# Patient Record
Sex: FEMALE | Race: WHITE
[De-identification: names, ages, dates, MRNs, and addresses within clinical notes are randomized per-mention and may not be internally consistent; named-entity substitution may affect disease eponyms.]

---

## 2017-10-10 ENCOUNTER — HOSPITAL ENCOUNTER (OUTPATIENT)
Dept: HOSPITAL 92 - SDC | Age: 67
Discharge: HOME | End: 2017-10-10
Attending: INTERNAL MEDICINE
Payer: MEDICARE

## 2017-10-10 VITALS — BODY MASS INDEX: 36.3 KG/M2

## 2017-10-10 DIAGNOSIS — I10: ICD-10-CM

## 2017-10-10 DIAGNOSIS — Z96.641: ICD-10-CM

## 2017-10-10 DIAGNOSIS — Z88.8: ICD-10-CM

## 2017-10-10 DIAGNOSIS — F31.9: ICD-10-CM

## 2017-10-10 DIAGNOSIS — K62.89: ICD-10-CM

## 2017-10-10 DIAGNOSIS — E03.9: ICD-10-CM

## 2017-10-10 DIAGNOSIS — K57.30: ICD-10-CM

## 2017-10-10 DIAGNOSIS — Z88.1: ICD-10-CM

## 2017-10-10 DIAGNOSIS — Z86.010: ICD-10-CM

## 2017-10-10 DIAGNOSIS — Z79.899: ICD-10-CM

## 2017-10-10 DIAGNOSIS — E66.01: ICD-10-CM

## 2017-10-10 DIAGNOSIS — Z90.49: ICD-10-CM

## 2017-10-10 DIAGNOSIS — M19.90: ICD-10-CM

## 2017-10-10 DIAGNOSIS — Z88.2: ICD-10-CM

## 2017-10-10 DIAGNOSIS — Z99.89: ICD-10-CM

## 2017-10-10 DIAGNOSIS — B15.9: ICD-10-CM

## 2017-10-10 DIAGNOSIS — Z87.891: ICD-10-CM

## 2017-10-10 DIAGNOSIS — Z12.11: Primary | ICD-10-CM

## 2017-10-10 DIAGNOSIS — G47.30: ICD-10-CM

## 2017-10-10 PROCEDURE — 0DJD8ZZ INSPECTION OF LOWER INTESTINAL TRACT, VIA NATURAL OR ARTIFICIAL OPENING ENDOSCOPIC: ICD-10-PCS | Performed by: INTERNAL MEDICINE

## 2017-10-10 NOTE — HP
Jarrell Armendariz is a 67-year-old  male with a history of colon  polyp.
  The patient had a colonoscopy and polypectomy 5 years ago.  The patient has 
family history of colon cancer.  Apparently, he had a dose of cancer.  At the 
present time, Ms. Vieyra has no specific GI symptoms.  He comes for colonoscopy 
because of history of colon polyp and also family history of colon cancer.

 

ALLERGIES:  BACTRIM Synvisc C.

 

SOCIAL HISTORY:  Former smoker.  Does not drink alcohol.

 

MEDICAL ILLNESSES:

1.  Hypertension.

2.  Morbid obesity.

3.  Heart murmur

4.  Hypothyroidism.

5.  Osteoarthritis.

6.  Depression.

7.  Status post open cholecystectomy.

8.  Status post right hip replacement.

9.  Removal of a  swelling over the right side which was benign tumor many 
years ago.

 

PHYSICAL EXAMINATION:

GENERAL:  Patient is obese.

VITAL SIGNS:  Pulse is 70, blood pressure 130/70.

HEENT:  Conjunctivae clear.

CARDIOVASCULAR:  First and second heart sounds normal.

LUNGS:  Clear to  auscultation.

ABDOMEN:  Soft to palpate.  Abdomen is nontender.  There is no organomegaly or 
masses.

EXTREMITIES:  Reveal no edema.

 

ADMITTING DIAGNOSES:  Colon polyps, family history  of colon cancer.

 

PLAN:  Colonoscopy.

 

MARICRUZ

## 2017-10-10 NOTE — OP
DATE OF SURGERY:  10/10/2017

 

OPERATIVE PROCEDURE:  Colonoscopy. 

 

PREOPERATIVE DIAGNOSIS:  A 67-year-old  female with history of colon polyp.  She also has f
amily history of colon cancer.

 

POSTOPERATIVE DIAGNOSES:

1.  Sigmoid diverticular disease.

2.  Occasional diverticula of the proximal colon.

3.  Hypertrophied anal papillae.

 

PROCEDURE NOTE:  The patient was placed on her left lateral position and [facility]  A rectal exam was done before scope was advanced into the rectum.  No lesions wer
e felt on rectal exam.  A Pentax video colonoscope was introduced into the rectum and advanced all t
he way to the cecum.  The ileocecal area, cecum, ascending colon, no pathology seen.  The hepatic fl
exure, no pathology seen.  The transverse colon showed an occasional diverticula.  The splenic flexu
re, descending colon, no lesions seen.  The sigmoid colon showed mild diverticular disease.  Retrofl
exion of the scope in the rectum showed hypertrophied anal papillae.

 

DISCHARGE PLANNING:  A 67-year-old  female with colon polyp and family history of colon can
cer.  She had a colonoscopy, which revealed basically hypertrophied anal papillae and also mild dive
rticulosis.

 

DISCHARGE RECOMMENDATIONS:

1.  High-fiber diet.

2.  The patient was advised to call me if she developed abdominal pain, hematochezia, or fever.

3.  Repeat colonoscopy in 3-5 years.

## 2018-05-23 ENCOUNTER — HOSPITAL ENCOUNTER (OUTPATIENT)
Dept: HOSPITAL 92 - BICMAMMO | Age: 68
Discharge: HOME | End: 2018-05-23
Attending: FAMILY MEDICINE
Payer: MEDICARE

## 2018-05-23 DIAGNOSIS — Z80.3: ICD-10-CM

## 2018-05-23 DIAGNOSIS — Z12.31: Primary | ICD-10-CM

## 2018-05-23 PROCEDURE — 77067 SCR MAMMO BI INCL CAD: CPT

## 2018-05-23 PROCEDURE — 77063 BREAST TOMOSYNTHESIS BI: CPT

## 2019-01-09 ENCOUNTER — HOSPITAL ENCOUNTER (INPATIENT)
Dept: HOSPITAL 18 - NAV ACUTE | Age: 69
LOS: 28 days | Discharge: HOME HEALTH | DRG: 950 | End: 2019-02-06
Attending: INTERNAL MEDICINE | Admitting: INTERNAL MEDICINE
Payer: MEDICARE

## 2019-01-09 VITALS — BODY MASS INDEX: 38.2 KG/M2

## 2019-01-09 DIAGNOSIS — S76.112D: Primary | ICD-10-CM

## 2019-01-09 DIAGNOSIS — F41.9: ICD-10-CM

## 2019-01-09 DIAGNOSIS — M19.90: ICD-10-CM

## 2019-01-09 DIAGNOSIS — Z87.891: ICD-10-CM

## 2019-01-09 DIAGNOSIS — Z88.1: ICD-10-CM

## 2019-01-09 DIAGNOSIS — G47.33: ICD-10-CM

## 2019-01-09 DIAGNOSIS — Z96.653: ICD-10-CM

## 2019-01-09 DIAGNOSIS — R53.1: ICD-10-CM

## 2019-01-09 DIAGNOSIS — Z79.899: ICD-10-CM
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
## 2019-01-09 DIAGNOSIS — Z90.49: ICD-10-CM

## 2019-01-09 DIAGNOSIS — N18.2: ICD-10-CM

## 2019-01-09 DIAGNOSIS — M81.0: ICD-10-CM

## 2019-01-09 DIAGNOSIS — R53.81: ICD-10-CM

## 2019-01-09 DIAGNOSIS — E78.5: ICD-10-CM

## 2019-01-09 DIAGNOSIS — E66.01: ICD-10-CM

## 2019-01-09 DIAGNOSIS — Z88.8: ICD-10-CM

## 2019-01-09 DIAGNOSIS — F31.9: ICD-10-CM

## 2019-01-09 DIAGNOSIS — I12.9: ICD-10-CM

## 2019-01-09 PROCEDURE — 80053 COMPREHEN METABOLIC PANEL: CPT

## 2019-01-09 PROCEDURE — 36415 COLL VENOUS BLD VENIPUNCTURE: CPT

## 2019-01-09 PROCEDURE — 5A09357 ASSISTANCE WITH RESPIRATORY VENTILATION, LESS THAN 24 CONSECUTIVE HOURS, CONTINUOUS POSITIVE AIRWAY PRESSURE: ICD-10-PCS | Performed by: INTERNAL MEDICINE

## 2019-01-09 PROCEDURE — 85025 COMPLETE CBC W/AUTO DIFF WBC: CPT

## 2019-01-09 RX ADMIN — HYDROCODONE BITARTRATE AND ACETAMINOPHEN SCH TAB: 10; 325 TABLET ORAL at 21:33

## 2019-01-10 LAB
ALBUMIN SERPL BCG-MCNC: 3.8 G/DL (ref 3.4–4.8)
ALP SERPL-CCNC: 72 U/L (ref 40–150)
ALT SERPL W P-5'-P-CCNC: 15 U/L (ref 8–55)
ANION GAP SERPL CALC-SCNC: 12 MMOL/L (ref 10–20)
AST SERPL-CCNC: 16 U/L (ref 5–34)
BASOPHILS # BLD AUTO: 0.1 THOU/UL (ref 0–0.2)
BASOPHILS NFR BLD AUTO: 0.9 % (ref 0–1)
BILIRUB SERPL-MCNC: 0.3 MG/DL (ref 0.2–1.2)
BUN SERPL-MCNC: 17 MG/DL (ref 9.8–20.1)
CALCIUM SERPL-MCNC: 9.6 MG/DL (ref 7.8–10.44)
CHLORIDE SERPL-SCNC: 102 MMOL/L (ref 98–107)
CO2 SERPL-SCNC: 30 MMOL/L (ref 23–31)
CREAT CL PREDICTED SERPL C-G-VRATE: 138 ML/MIN (ref 70–130)
EOSINOPHIL # BLD AUTO: 0.2 THOU/UL (ref 0–0.7)
EOSINOPHIL NFR BLD AUTO: 2.2 % (ref 0–10)
GLOBULIN SER CALC-MCNC: 2.6 G/DL (ref 2.4–3.5)
GLUCOSE SERPL-MCNC: 103 MG/DL (ref 80–115)
HGB BLD-MCNC: 11.5 G/DL (ref 12–16)
LYMPHOCYTES # BLD AUTO: 2.4 THOU/UL (ref 1.2–3.4)
LYMPHOCYTES NFR BLD AUTO: 26.9 % (ref 21–51)
MCH RBC QN AUTO: 28.7 PG (ref 27–31)
MCV RBC AUTO: 87.5 FL (ref 78–98)
MONOCYTES # BLD AUTO: 0.8 THOU/UL (ref 0.11–0.59)
MONOCYTES NFR BLD AUTO: 8.8 % (ref 0–10)
NEUTROPHILS # BLD AUTO: 5.5 THOU/UL (ref 1.4–6.5)
NEUTROPHILS NFR BLD AUTO: 61.2 % (ref 42–75)
PLATELET # BLD AUTO: 317 THOU/UL (ref 130–400)
POTASSIUM SERPL-SCNC: 3.8 MMOL/L (ref 3.5–5.1)
RBC # BLD AUTO: 4.03 MILL/UL (ref 4.2–5.4)
SODIUM SERPL-SCNC: 140 MMOL/L (ref 136–145)
WBC # BLD AUTO: 9 THOU/UL (ref 4.8–10.8)

## 2019-01-10 RX ADMIN — HYDROCODONE BITARTRATE AND ACETAMINOPHEN PRN TAB: 10; 325 TABLET ORAL at 15:15

## 2019-01-10 RX ADMIN — HYDROCODONE BITARTRATE AND ACETAMINOPHEN PRN TAB: 10; 325 TABLET ORAL at 20:45

## 2019-01-10 RX ADMIN — HYDROCODONE BITARTRATE AND ACETAMINOPHEN PRN TAB: 10; 325 TABLET ORAL at 03:04

## 2019-01-10 RX ADMIN — HYDROCODONE BITARTRATE AND ACETAMINOPHEN SCH: 10; 325 TABLET ORAL at 02:00

## 2019-01-10 RX ADMIN — HYDROCODONE BITARTRATE AND ACETAMINOPHEN PRN TAB: 10; 325 TABLET ORAL at 06:33

## 2019-01-10 RX ADMIN — HYDROCODONE BITARTRATE AND ACETAMINOPHEN PRN TAB: 10; 325 TABLET ORAL at 10:58

## 2019-01-10 NOTE — PRG
DATE OF SERVICE:  01/10/2019



SUBJECTIVE:  The patient feels well, cooperating with therapy today, but states she

was exhausted in the afternoon, so did not receive therapy this afternoon.  She

however is felt well, but wants to ensure that she gets her Lamictal at night and

amphetamine during the day.  Pharmacy states that we do not have extra amphetamines,

so we will give her methylphenidate equivalent twice daily. 



OBJECTIVE:  VITAL SIGNS:  Blood pressure 111/56, temperature is 98, pulse 83,

respirations are 18, and O2 sats 91% on room air. 

LUNGS:  Clear. 

CARDIAC:  Examination showed regular rhythm. 

ABDOMEN:  Soft and nontender. 

EXTREMITIES:  Left leg is in a splint.  Minimal pain, functioning well with therapy.



ASSESSMENT:  Resolving left total knee with subsequent patellar tendon tear after a

fall with subsequent repair.  Now in knee immobilizer with minimal pain at rest but

cooperating with therapy.  She also has significant bipolar disease, but appears to

be stable at this time. 



PLAN:  Continue PT/OT.  Change Adderall to Ritalin 10 mg twice daily at Pharmacy's

suggestion.  Continue Lamictal 200 at night.  Continue pain relief as needed.

Continue postoperative antibiotics.  Continue stress ulcer and DVT prophylaxis. 







Job ID:  [id number]

## 2019-01-10 NOTE — HP
Ms. Bonnie Vieyra is a patient of Dr. Serena Cabrales and Dr. Hunter Milligan.



HISTORY OF PRESENT ILLNESS:  The patient is a 68-year-old white female who underwent

left total knee replacement on October 2, 2018, and did well until approximately 3

weeks postop when she fell and suffered a lateral subluxation of her patella

requiring surgical repair.  She did well again on this until 6 weeks after that

procedure when she sustained another fall with complete lateral dislocation of

patella requiring open reduction, repair of quadriceps tendon rupture and the medial

reticular tear.  She did well postoperatively in the hospital, but was admitted to

the skilled unit today for continued therapy and for safety awareness, she is on a

knee immobilizer at all times until seen by her orthopedic surgeon in 2 weeks. 



PAST MEDICAL HISTORY:  Remarkable for;

1. Hypertension, which has been well controlled.

2. Dyslipidemia.

3. Morbid obesity.

4. Osteoarthritis.

5. Bipolar disorder.

6. Obstructive sleep apnea.



PAST SURGICAL HISTORY:  Positive for right knee replacement many years ago,

cholecystectomy, and the above-mentioned left knee replacement. 



FAMILY MEDICAL HISTORY:  Positive for stroke in her mother.  Breast cancer in two

sisters. 



SOCIAL HISTORY:  She has not smoked for 35 years.



ALLERGIES:  SHE HAS HISTORY OF ALLERGIES TO HYALURONIC ACID INJECTIONS AND BACTRIM.



HOME MEDICATIONS:  Include;

1. Hydrochlorothiazide 25 daily.

2. Levothyroxine 75 daily.

3. Fosamax 70 weekly.

4. Gabapentin 600 three times daily.

5. Lamictal 150 mg at night.

6. Clonazepam 1 mg twice daily.

7. Adderall 20 mg twice daily.



REVIEW OF SYSTEMS:  HEENT:  She denies any headaches, dizziness, change in vision or

hearing, hoarseness or dysphagia. 

PULMONARY:  She denies any cough, sputum production, pneumonia, asthma, or

tuberculosis. 

CARDIOVASCULAR:  She denies any chest pain, orthopnea, paroxysmal nocturnal dyspnea,

or edema. 

GASTROINTESTINAL:  Denies nausea, vomiting, diarrhea, constipation, or abdominal

pain. 

GENITOURINARY:  Denies dysuria, hematuria, or nocturia. 

MUSCULOSKELETAL:  Does have some arthritis and arthralgias, multiple joints. 

NEUROLOGIC:  Denies localized numbness or weakness to arms and extremities.



PHYSICAL EXAMINATION:

GENERAL:  The patient is a middle-aged white female in no acute distress.  Oriented

x3.  Cooperative. 

VITAL SIGNS:  Showed to have a blood pressure of 137/64, temperature is 99, pulse

87, respirations 16, and O2 sats 96% on room air. 

HEENT:  Pupils are equal, round, and reactive to light and accommodation.  Sclerae

anicteric.  Conjunctivae pale.  Oral mucous membranes, well hydrated. 

NECK:  Supple.  There are no nodes or masses.  JVP is not elevated. 

LUNGS:  Clear. 

CARDIAC:  Showed regular rhythm. 

ABDOMEN:  Soft and nontender. 

SKIN AND EXTREMITIES:  Show left knee in knee immobilizer, but removed dressing,

evaluated shows minimal drainage.  No erythema of the leg. 

NEUROLOGICAL:  No focal findings.



LABORATORY DATA:  Laboratories pending.  Previous labs showed creatinine of 1.0.

Platelets of 368, white count 9900, hemoglobin 13.2.  Potassium of 3.5. 



ASSESSMENT:  

1. Left knee replacement status post fall with quadriceps tendon repair and medial

patellofemoral ligament reconstruction.  She was admitted to skilled unit for PT,

OT, and monitoring of safety while incomplete knee immobilization. 

2. Hypertension, well controlled.

3. Bipolar disorder, stable, according to the patient, on long-term medications.

4. Obstructive sleep apnea, on CPAP.  We will obtain machine.

5. Chronic kidney disease stage 2, and we will obtain lab.



PLAN:  Continue pantoprazole for GI prophylaxis.  Lovenox for DVT prophylaxis.

Fosamax as ordered.  Cardiac diet.  CPAP at night.  Continue prehospitalization

medications of fluoxetine, Lamictal, clonazepam, and hydrocodone as needed for pain. 







Job ID:  [id number]

## 2019-01-11 RX ADMIN — HYDROCODONE BITARTRATE AND ACETAMINOPHEN PRN TAB: 10; 325 TABLET ORAL at 14:04

## 2019-01-11 RX ADMIN — HYDROCODONE BITARTRATE AND ACETAMINOPHEN PRN TAB: 10; 325 TABLET ORAL at 09:39

## 2019-01-11 RX ADMIN — HYDROCODONE BITARTRATE AND ACETAMINOPHEN PRN TAB: 10; 325 TABLET ORAL at 05:03

## 2019-01-11 RX ADMIN — HYDROCODONE BITARTRATE AND ACETAMINOPHEN PRN TAB: 10; 325 TABLET ORAL at 00:53

## 2019-01-11 RX ADMIN — HYDROCODONE BITARTRATE AND ACETAMINOPHEN PRN TAB: 10; 325 TABLET ORAL at 20:38

## 2019-01-12 RX ADMIN — HYDROCODONE BITARTRATE AND ACETAMINOPHEN PRN TAB: 10; 325 TABLET ORAL at 04:49

## 2019-01-12 RX ADMIN — HYDROCODONE BITARTRATE AND ACETAMINOPHEN PRN TAB: 10; 325 TABLET ORAL at 10:46

## 2019-01-12 RX ADMIN — HYDROCODONE BITARTRATE AND ACETAMINOPHEN PRN TAB: 10; 325 TABLET ORAL at 19:57

## 2019-01-12 RX ADMIN — HYDROCODONE BITARTRATE AND ACETAMINOPHEN PRN TAB: 10; 325 TABLET ORAL at 09:53

## 2019-01-12 RX ADMIN — HYDROCODONE BITARTRATE AND ACETAMINOPHEN PRN TAB: 10; 325 TABLET ORAL at 15:11

## 2019-01-12 NOTE — PRG
DATE OF SERVICE:  01/12/2019



SUBJECTIVE:  Ms. Vieyra is doing well except for pain.  She states that one Norco is

not helping, I did increase it to two hydrocodone q.4 p.r.n. and that seemed to

help.  No other concerns or questions.  No family at bedside. 



OBJECTIVE:  VITAL SIGNS:  She is afebrile.  Heart rate 73, respirations 20, oxygen

saturation 94% on room air, and blood pressure 107/54. 

CARDIOVASCULAR:  S1 and S2 plus. 

RESPIRATORY:  Normal vesicular breath sounds. 

ABDOMEN:  Soft, nontender.  Bowel sounds heard in all quadrants. 

EXTREMITIES:  Without cyanosis or clubbing.  Left leg in a knee immobilizer.



LABORATORY DATA:  Laboratory value shows white count of 9, H and H are 11.5 and

33.2, this is from January 10th.  Sodium 140, potassium 3.8, BUN and creatinine 17

and 0.70. 



IMPRESSION:  

1. Left knee replacement complicated by two falls causing initially a lateral

subluxation of patella, requiring surgical repair and another fall causing complete

lateral dislocation of the patella as well as quadriceps tendon rupture and medial

retinacular tear. 

2. Hypertension.

3. Dyslipidemia.

4. Morbid obesity.

5. Degenerative joint disease.

6. Obstructive sleep apnea.

7. Bipolar disorder and osteoporosis.



PLAN:  

1. Increase Norco to 10/325 two tablets q.4 p.r.n.

2. Continue Orthopedic precautions.

3. Physical therapy.

4. Nocturnal CPAP.

5. Heart healthy diet.

6. DVT and stress ulcer prophylaxis.

7. Decubitus precautions.

8. Routine laboratory values.







Job ID:  [id number]

## 2019-01-13 RX ADMIN — HYDROCODONE BITARTRATE AND ACETAMINOPHEN PRN TAB: 10; 325 TABLET ORAL at 06:04

## 2019-01-13 RX ADMIN — HYDROCODONE BITARTRATE AND ACETAMINOPHEN PRN TAB: 10; 325 TABLET ORAL at 17:59

## 2019-01-13 RX ADMIN — HYDROCODONE BITARTRATE AND ACETAMINOPHEN PRN TAB: 10; 325 TABLET ORAL at 10:20

## 2019-01-13 RX ADMIN — HYDROCODONE BITARTRATE AND ACETAMINOPHEN PRN TAB: 10; 325 TABLET ORAL at 01:39

## 2019-01-13 NOTE — PRG
DATE OF SERVICE:  01/13/2019



SUBJECTIVE:  Ms. Vieyra is doing well.  She states that they are giving her Adderall

morning and night and she normally took it at like 7:00 a.m. and 2:00 p.m., we will

change the timing.  She also is having some wax in her ears and would like some

Debrox ear drops.  She otherwise is doing well.  Pain is much better with two Norco

as needed.  No family at bedside. 



OBJECTIVE:  VITAL SIGNS:  She is afebrile.  Heart rate 80, respirations 24, oxygen

saturation 94% on room air, and blood pressure 107/67. 

CARDIOVASCULAR:  S1 and S2 plus. 

RESPIRATORY:  Normal vesicular breath sounds. 

ABDOMEN:  Soft, nontender.  Bowel sounds heard in all quadrants. 

EXTREMITIES:  Without cyanosis, clubbing.  Left leg in an immobilizer.



IMPRESSION:  

1. Quadriceps tendon rupture, medial retinacular tear and complete lateral

dislocation of the left patella, requiring surgical fixation and immobilization. 

2. Hypertension.

3. Dyslipidemia.

4. Morbid obesity.

5. Degenerative joint disease.

6. Obstructive sleep apnea.

7. Bipolar disorder.

8. Attention deficit hyperactivity disorder.

9. Osteoporosis.

10. Impacted cerumen, right ear.



PLAN:  

1. Change Adderall timings to 7:00 a.m. and 2:00 p.m.

2. Debrox ear drops, 3 drops three times daily for five days to right ear.

3. Continue current medications.

4. DVT and stress ulcer prophylaxis.

5. Decubitus precautions.

6. Heart healthy diet.

7. Nocturnal CPAP.

8. Orthopedic precautions.

9. Physical therapy.

10. Discussed with the patient in detail and all questions answered.







Job ID:  [id number]

## 2019-01-14 RX ADMIN — HYDROCODONE BITARTRATE AND ACETAMINOPHEN PRN TAB: 10; 325 TABLET ORAL at 08:25

## 2019-01-14 RX ADMIN — HYDROCODONE BITARTRATE AND ACETAMINOPHEN PRN TAB: 10; 325 TABLET ORAL at 03:12

## 2019-01-14 RX ADMIN — HYDROCODONE BITARTRATE AND ACETAMINOPHEN PRN TAB: 10; 325 TABLET ORAL at 21:14

## 2019-01-14 NOTE — PRG
DATE OF SERVICE:  01/14/2019



SUBJECTIVE:  The patient feels well, but is very anxious and nervous and still

having insomnia.  She is proud that she is walking 216 feet with a walker today and

is having persistent, but tolerable and decreasing left knee pain, but is having her

persistent chronic anxiety, depressive problems, and feels that her sleep habits are

worsening in the hospital and limiting her therapy. 



OBJECTIVE:  VITAL SIGNS:  Shows temperature is 97.7, pulse 84, respirations 20, O2

sats 98% on room air, blood pressure 135/68. 

LUNGS:  Clear. 

CARDIAC:  Showed regular rhythm. 

ABDOMEN:  Soft and nontender. 

EXTREMITIES:  Left leg is in a knee immobilizer with tenderness only over the knee.

Appointment on January 25 with the orthopedic surgeon. 



ASSESSMENT:  

1. Resolving left knee ligament surgery.

2. Persistent bipolar disorder.



PLAN:  Continue PT, OT.  Continue Ritalin.  Continue Lamictal.  Add melatonin 9 mg

at night as the patient is worried about hypnotics, but may need an Ambien in the

future.  Continue stress ulcer and DVT prophylaxis.  Follow up with orthopedic

surgeon in 11 days. 







Job ID:  [id number]

## 2019-01-15 RX ADMIN — HYDROCODONE BITARTRATE AND ACETAMINOPHEN PRN TAB: 10; 325 TABLET ORAL at 13:39

## 2019-01-15 RX ADMIN — HYDROCODONE BITARTRATE AND ACETAMINOPHEN PRN TAB: 10; 325 TABLET ORAL at 08:15

## 2019-01-15 RX ADMIN — HYDROCODONE BITARTRATE AND ACETAMINOPHEN PRN TAB: 10; 325 TABLET ORAL at 20:51

## 2019-01-16 RX ADMIN — HYDROCODONE BITARTRATE AND ACETAMINOPHEN PRN TAB: 10; 325 TABLET ORAL at 14:21

## 2019-01-16 RX ADMIN — HYDROCODONE BITARTRATE AND ACETAMINOPHEN PRN TAB: 10; 325 TABLET ORAL at 09:27

## 2019-01-16 RX ADMIN — HYDROCODONE BITARTRATE AND ACETAMINOPHEN PRN TAB: 10; 325 TABLET ORAL at 19:08

## 2019-01-17 RX ADMIN — HYDROCODONE BITARTRATE AND ACETAMINOPHEN PRN TAB: 10; 325 TABLET ORAL at 12:01

## 2019-01-17 RX ADMIN — HYDROCODONE BITARTRATE AND ACETAMINOPHEN PRN TAB: 10; 325 TABLET ORAL at 07:45

## 2019-01-17 NOTE — PRG
DATE OF SERVICE:  01/15/2019



SUBJECTIVE:  The patient feels much better, has been sleeping well through the night

with her melatonin, feels rested, and is ready to do more therapy.  States that she

is much less anxious and is dealing with her psychological problems. 



OBJECTIVE:  Shows VITAL SIGNS:  Her blood pressure is 125/60, temperature is 98,

pulse is 80, respirations are 16, O2 saturation is 98% on room air. 

LUNGS:  Clear. 

CARDIAC:  Showed regular rhythm. 

ABDOMEN:  Soft and nontender. 

SKIN AND EXTREMITIES:  Left leg in knee immobilizer with tenderness only on the knee.



ASSESSMENT:  

1. Stable status post left total knee with left ligament damage, status post

revision, and now in knee immobilizer with followup with orthopedic surgeon in 8

days. 

2. Persistent bipolar disorder with decreased insomnia with melatonin.  We will

continue. 

3. Severe anxiety, appears to be improving.

4. Morbid obesity.



PLAN:  

1. Continue PT/OT.

2. Continue Ritalin to replace Adderall, non-formulary.

3. Continue Lamictal.

4. Continue melatonin 9 mg nightly as she is doing very well.

5. Continue stress ulcer and DVT prophylaxis.

6. Continue Keflex until seen by orthopedic surgeon.







Job ID:  [id number]

## 2019-01-17 NOTE — PRG
DATE OF SERVICE:  01/16/2019



SUBJECTIVE:  The patient feels well, cooperating therapy, sleeping well, and feels

she is improving. 



OBJECTIVE:  VITAL SIGNS:  Blood pressure is 128/59, temperature 97.6, pulse 83,

respirations 18, O2 sats 95% on room air. 

Lungs:  Clear. 

CARDIAC:  Showed regular rhythm. 

ABDOMEN:  Soft and nontender.  The patient is much less anxious.  No tremor.

Cooperating with therapy.  Left leg still in immobilizer with decreasing pain. 



ASSESSMENT:  

1. Resolving left total knee and revision for ligament tears.

2. Morbid obesity, stable.

3. Bipolar disorder, stable, on multiple medications.



PLAN:  Continue PT, OT.  Continue to monitor for psychological problems.  Continue

pain relief as needed, but is improving.  Continue Keflex until seen by Surgery. 







Job ID:  [id number]

## 2019-01-17 NOTE — PRG
DATE OF SERVICE:  01/17/2019



SUBJECTIVE:  The patient feels well, who has been sleeping well as she takes her

anxiety medicine with melatonin.  She is anxious about stopping her antibiotics

prior to seeing her orthopedic surgeon, and we will continue on this.  She is also

asking if she can have her hydrochlorothiazide increased from 12.5 to 25 mg. 



OBJECTIVE:  VITAL SIGNS:  Show her temperature is 95.6, pulse 86, respirations 20,

O2 sat is 97% on room air, and blood pressure 116/61. 

NEUROLOGIC:  She is still having occasional crying spells, but otherwise,

cooperating well with therapy. 

MUSCULOSKELETAL:  Left knee is still in immobilizer, decreasing pain.



ASSESSMENT:  

1. Resolving left total knee with left ligament damage, status post revision with

persistent antibiotic treatment until seen by orthopedic surgeon next week. 

2. Persistent bipolar disorder with decreased insomnia with persistent anxiety and

depression, tearfulness in a.m. with cooperating with therapy. 

3. Morbid obesity.



PLAN:  Continue Keflex.  Increase hydrochlorothiazide to 25 mg daily.  Continue

melatonin and anxiety medicine at night.  Continue PT and OT. 







Job ID:  [id number]

## 2019-01-18 RX ADMIN — HYDROCODONE BITARTRATE AND ACETAMINOPHEN PRN TAB: 10; 325 TABLET ORAL at 05:59

## 2019-01-18 RX ADMIN — HYDROCODONE BITARTRATE AND ACETAMINOPHEN PRN TAB: 10; 325 TABLET ORAL at 10:04

## 2019-01-18 RX ADMIN — HYDROCODONE BITARTRATE AND ACETAMINOPHEN PRN TAB: 10; 325 TABLET ORAL at 18:30

## 2019-01-19 RX ADMIN — HYDROCODONE BITARTRATE AND ACETAMINOPHEN PRN TAB: 10; 325 TABLET ORAL at 08:36

## 2019-01-19 NOTE — PRG
DATE OF SERVICE:  01/19/2019



SUBJECTIVE:  The patient feels very tearful, upset tonight.  States she is having

increased depression, anxiety, but does not wish to change her medication.  States

she is not sleeping now with the melatonin, but does not wish to change that either. 



OBJECTIVE:  VITAL SIGNS:  Blood pressure is 141/63, temperature is 98, pulse 79,

respirations 20, O2 sats 96% on room air. 

LUNGS:  Clear. 

CARDIAC:  Regular rhythm. 

ABDOMEN:  Soft and nontender. 

EXTREMITIES:  Left leg is in the immobilizer. 

PSYCHIATRIC:  The patient does appear to be tearful and depressed.



ASSESSMENT:  

1. Bipolar disorder, severe, with persistent symptoms and we will discuss change in

medications or another referral with patient. 

2. Left knee immobilizer in place for revision of left total knee after a fall,

healing well with decreasing pain. 

3. Morbid obesity, stable.



PLAN:  

1. Continue melatonin.

2. Discussed change in med with patient.

3. Discussed Psychology referral as well as follow up with orthopedic surgeon next

week. 







Job ID:  [id number]

## 2019-01-19 NOTE — PRG
DATE OF SERVICE:  01/18/2019



SUBJECTIVE:  The patient feels better with increased sleep, decreased insomnia, but

still having episodes of depression, anxiety in the a.m., somewhat limiting her

therapy, but however, she is cooperating. 



OBJECTIVE:  VITAL SIGNS:  Blood pressure is 109/63, temperature 96, pulse 80,

respirations 18, and O2 sats 98% on room air. 

LUNGS:  Clear. 

CARDIAC:  Showed regular rhythm. 

ABDOMEN:  Soft and nontender. 

EXTREMITIES:  Left leg is in a knee immobilizer with minimal pain.



ASSESSMENT:  

1. Resolving left total knee with subsequent fall and ligament damage requiring

revision, improving now with the immobilizer on oral antibiotics until seen by the

orthopedic surgeon next week. 

2. Persistent severe bipolar disorder with tearfulness and crying daily, but does

not wish to change her medications. 

3. Insomnia appears to be improving slightly.

4. Morbid obesity.



PLAN:  Continue Keflex.  Continue hydrochlorothiazide 25 daily.  Continue melatonin

9 mg nightly.  Continue psych medications.  Follow up with orthopedic surgeon next

week. 







Job ID:  [id number]

## 2019-01-20 RX ADMIN — HYDROCODONE BITARTRATE AND ACETAMINOPHEN PRN TAB: 10; 325 TABLET ORAL at 07:25

## 2019-01-21 LAB
ALBUMIN SERPL BCG-MCNC: 4.5 G/DL (ref 3.4–4.8)
ALP SERPL-CCNC: 74 U/L (ref 40–150)
ALT SERPL W P-5'-P-CCNC: 26 U/L (ref 8–55)
ANION GAP SERPL CALC-SCNC: 16 MMOL/L (ref 10–20)
AST SERPL-CCNC: 21 U/L (ref 5–34)
BASOPHILS # BLD AUTO: 0.1 THOU/UL (ref 0–0.2)
BASOPHILS NFR BLD AUTO: 0.9 % (ref 0–1)
BILIRUB SERPL-MCNC: 0.3 MG/DL (ref 0.2–1.2)
BUN SERPL-MCNC: 14 MG/DL (ref 9.8–20.1)
CALCIUM SERPL-MCNC: 10.4 MG/DL (ref 7.8–10.44)
CHLORIDE SERPL-SCNC: 106 MMOL/L (ref 98–107)
CO2 SERPL-SCNC: 23 MMOL/L (ref 23–31)
CREAT CL PREDICTED SERPL C-G-VRATE: 118 ML/MIN (ref 70–130)
EOSINOPHIL # BLD AUTO: 0.1 THOU/UL (ref 0–0.7)
EOSINOPHIL NFR BLD AUTO: 2 % (ref 0–10)
GLOBULIN SER CALC-MCNC: 3 G/DL (ref 2.4–3.5)
GLUCOSE SERPL-MCNC: 137 MG/DL (ref 80–115)
HGB BLD-MCNC: 12.9 G/DL (ref 12–16)
LYMPHOCYTES # BLD AUTO: 2.3 THOU/UL (ref 1.2–3.4)
LYMPHOCYTES NFR BLD AUTO: 30.7 % (ref 21–51)
MCH RBC QN AUTO: 27.6 PG (ref 27–31)
MCV RBC AUTO: 88 FL (ref 78–98)
MONOCYTES # BLD AUTO: 0.5 THOU/UL (ref 0.11–0.59)
MONOCYTES NFR BLD AUTO: 6.4 % (ref 0–10)
NEUTROPHILS # BLD AUTO: 4.5 THOU/UL (ref 1.4–6.5)
NEUTROPHILS NFR BLD AUTO: 60.1 % (ref 42–75)
PLATELET # BLD AUTO: 345 THOU/UL (ref 130–400)
POTASSIUM SERPL-SCNC: 3.5 MMOL/L (ref 3.5–5.1)
RBC # BLD AUTO: 4.65 MILL/UL (ref 4.2–5.4)
SODIUM SERPL-SCNC: 141 MMOL/L (ref 136–145)
WBC # BLD AUTO: 7.5 THOU/UL (ref 4.8–10.8)

## 2019-01-21 RX ADMIN — HYDROCODONE BITARTRATE AND ACETAMINOPHEN PRN TAB: 10; 325 TABLET ORAL at 07:58

## 2019-01-21 RX ADMIN — HYDROCODONE BITARTRATE AND ACETAMINOPHEN PRN TAB: 10; 325 TABLET ORAL at 12:25

## 2019-01-21 NOTE — PRG
DATE OF SERVICE:  01/20/2019



SUBJECTIVE:  The patient lying in bed resting.  States she feels well with her

chronic depression, but is having decreasing pain in her left leg. 



OBJECTIVE:  VITAL SIGNS:  Shows temperature is 98.3, pulse 82, respirations 16, O2

saturations 99% on room air, blood pressure 135/58. 

LUNGS:  Clear. 

CARDIAC:  Regular rhythm. 

ABDOMEN:  Soft and nontender. 

EXTREMITIES:  Left leg is in a knee immobilizer.  Minimal tenderness.



ASSESSMENT:  

1. Healing left total knee replacement status post revision after fall, requiring

repair of ligaments, knee immobilized.  Follow up with orthopedic surgeon this week. 

2. Severe, but stable bipolar disorder on home medications with recurrent

depression, but is cooperating with therapy. 

3. Morbid obesity, stable.



PLAN:  

1. Continue to offer change in medications or follow up with Psychology of the

patient. 

2. Follow up with orthopedic surgeon next week.

3. Continue medications as ordered.







Job ID:  [id number]

## 2019-01-21 NOTE — PRG
DATE OF SERVICE:  01/21/2019



SUBJECTIVE:  The patient up in a chair, watching TV, waiting for therapy.  States

she slept well last night and is much more cheerful today and it was decided she is

going to get better and is very thankful for being here. 



OBJECTIVE:  VITAL SIGNS:  Shows temperature is 98.6, pulse 83, respiration 19, O2

saturations 99% on room air, blood pressure 125/61. 

LUNGS:  Clear. 

CARDIAC:  Shows regular rhythm. 

EXTREMITIES:  Left leg in knee immobilizer with some soreness on ambulation, but not

at rest.  She has been doing her therapy in the bed. 



ASSESSMENT:  

1. Resolving left total knee, status post fall with requirement of revision of torn

ligament, improving in knee immobilizer for the last 3 weeks with another 3 weeks to

go to follow up with orthopedic surgeon this week. 

2. Bipolar disorder, appears to be improved today and no change in medicines.  We

will continue to follow. 



PLAN:  Continue PT, OT and discuss with therapy. 



Followup with orthopedic surgeon this week. 



Continue pain relief as needed. 



Continue bipolar medications that she was on prior to hospitalization.







Job ID:  [id number]

## 2019-01-22 RX ADMIN — HYDROCODONE BITARTRATE AND ACETAMINOPHEN PRN TAB: 10; 325 TABLET ORAL at 08:03

## 2019-01-22 RX ADMIN — HYDROCODONE BITARTRATE AND ACETAMINOPHEN PRN TAB: 10; 325 TABLET ORAL at 12:37

## 2019-01-22 NOTE — PRG
DATE OF SERVICE:  01/22/2019



SUBJECTIVE:  The patient feels well and is going to see her orthopedic surgeon

tomorrow.  She is complaining of some itching in her leg, but is wishing to do more

movement and hopefully can be approved tomorrow.  She did have some depression this

morning, but is cooperating well with therapy. 



OBJECTIVE:  VITAL SIGNS:  Blood pressure is 115/58, temperature 97, pulse 80,

respirations 18, O2 saturation 93% on room air. 

LUNGS:  Clear. 

CARDIAC:  Showed regular rhythm. 

ABDOMEN:  Soft and nontender. 

EXTREMITIES: Left knee is in a knee immobilizer.



ASSESSMENT:  

1. Resolving left total knee with subsequent fall and ligament injury, requiring

revision and knee immobilizer for another 3 weeks. 

2. Bipolar disorder, stable.



PLAN:  Follow up with orthopedic surgeon tomorrow.  Discuss CPM with surgeon.

Discuss more PT and OT with surgeon.  Continue bipolar medications as ordered and

pain relief as needed. 







Job ID:  [id number]

## 2019-01-23 RX ADMIN — HYDROCODONE BITARTRATE AND ACETAMINOPHEN PRN TAB: 10; 325 TABLET ORAL at 16:26

## 2019-01-23 RX ADMIN — HYDROCODONE BITARTRATE AND ACETAMINOPHEN PRN TAB: 10; 325 TABLET ORAL at 00:57

## 2019-01-23 RX ADMIN — HYDROCODONE BITARTRATE AND ACETAMINOPHEN PRN TAB: 10; 325 TABLET ORAL at 06:28

## 2019-01-23 RX ADMIN — HYDROCODONE BITARTRATE AND ACETAMINOPHEN PRN TAB: 10; 325 TABLET ORAL at 10:30

## 2019-01-23 NOTE — PRG
DATE OF SERVICE:  01/23/2019



SUBJECTIVE:  The patient feels well, who was very upset today when a

miscommunication about her weightbearing status and told that she needed to be

nonweightbearing by the nurses, even though her surgeon had confirmed partial

weightbearing.  This was confirmed again this afternoon when she personally talked

to the surgeon. 



OBJECTIVE:  VITAL SIGNS:  Show blood pressure 136/63, temperature 97, pulse 78,

respirations 20, and O2 sat is 95% on room air. 

LUNGS:  Clear. 

CARDIAC:  Showed regular rhythm. 

ABDOMEN:  Soft and nontender. 

EXTREMITIES:  Left knee is healing well with staples removed, still in knee

immobilizer. 



ASSESSMENT:  

1. Resolving left total knee, status post fall and revision, requiring repair of

ligaments, who has been improving greatly and has been seen by her surgeon, Dr. Milligan today and has approved her for partial weightbearing, continued therapy. 

2. Bipolar disorder, stable, despite stressful news.



PLAN:  Continue PT/OT.  Continue pain relief as needed.  Continue bipolar medication.







Job ID:  [id number]

## 2019-01-24 RX ADMIN — HYDROCODONE BITARTRATE AND ACETAMINOPHEN PRN TAB: 10; 325 TABLET ORAL at 15:00

## 2019-01-24 RX ADMIN — HYDROCODONE BITARTRATE AND ACETAMINOPHEN PRN TAB: 10; 325 TABLET ORAL at 09:27

## 2019-01-25 RX ADMIN — HYDROCODONE BITARTRATE AND ACETAMINOPHEN PRN TAB: 10; 325 TABLET ORAL at 12:27

## 2019-01-25 RX ADMIN — HYDROCODONE BITARTRATE AND ACETAMINOPHEN PRN TAB: 10; 325 TABLET ORAL at 07:57

## 2019-01-26 RX ADMIN — HYDROCODONE BITARTRATE AND ACETAMINOPHEN PRN TAB: 10; 325 TABLET ORAL at 06:25

## 2019-01-26 RX ADMIN — HYDROCODONE BITARTRATE AND ACETAMINOPHEN PRN TAB: 10; 325 TABLET ORAL at 15:05

## 2019-01-26 NOTE — PRG
DATE OF SERVICE:  01/26/2019



SUBJECTIVE:  Mrs. Vieyra is a very pleasant 68-year-old white female who had a total

knee replacement done on October 2nd.  She did well, but fell postoperatively.  She

subluxed her patella requiring surgical repair.  Approximately 6 weeks after that,

she fell again and had a lateral dislocation of the patella, requiring open

reduction.  She had to repair the quadriceps tendon rupture and the medial reticular

tear.  Postoperatively, she did well, but is now confined to the hospital until she

gets better.  She has a knee immobilizer on and she sees her orthopedic surgeon

every two weeks. 



Subjectively, she states she is doing very well and has no complaints.  She is doing

well with physical therapy and occupational therapy. 



She has a weekend off and she is resting.  She is getting out of the bed and getting

around the wheelchair. 



She states she is doing well, and has no complaints today.



OBJECTIVE:  VITAL SIGNS:  Today, reveal blood pressure 126/70, pulse 85 to 90,

respirations 18, O2 saturation 96 to 97% on room air, T-max 98.1. 



PHYSICAL EXAMINATION:

GENERAL:  This is a well-developed, well-nourished, very pleasant 68-year-old white

female, in no apparent distress at this time. 

HEENT:  Reveals normocephalic and nontraumatic cranium.  Pupils are equally round

and reactive.  Extraocular movements are intact.  Nose and throat are slightly dry. 

NECK:  Supple without masses, nodes, or bruits. 

CHEST:  Clear to auscultation.  No rales, rhonchi, wheezes, or cough is noted. 

HEART:  Reveals a regular rate and rhythm without murmurs, gallops, or rubs. 

ABDOMEN:  Soft, nontender, slightly obese.  No rebound or guarding is noted.  Normal

bowel sounds noted in all four quadrants. 

:  Deferred. 

EXTREMITIES:  Left knee immobilizer in place.  Dressing in place. 

NEUROLOGIC:  The patient is oriented x3.  No focal findings.



ASSESSMENT:  

1. Left knee status post fall with quadriceps tendon repair and medial

patellofemoral ligament reconstruction. 

2. Hypertension.  Continues to be well controlled.

3. Bipolar disorder.

4. Obstructive sleep apnea, on CPAP.

5. Stage 2 chronic kidney disease.



PLAN:  

1. Continue physical therapy and occupational therapy.

2. Continue to monitor the patient's blood pressure closely.

3. Stress ulcer prophylaxis.

4. Decubitus precautions.

5. Continue to wear CPAP nightly. 

Dr. Choudhury will follow the patient back on Monday.







Job ID:  [id number]

## 2019-01-27 RX ADMIN — HYDROCODONE BITARTRATE AND ACETAMINOPHEN PRN TAB: 10; 325 TABLET ORAL at 09:24

## 2019-01-27 NOTE — PRG
DATE OF SERVICE:  01/27/2019



SUBJECTIVE:  Ms. Vieyra is a very pleasant 68-year-old white female.  She had a total

right knee replaced on October 2, 2018.  Unfortunately, postoperatively, she fell

and subluxed her patella, which required surgical repair.  Six weeks after that, she

fell again and had a lateral dislocation of the patella requiring open reduction.

They had to repair the quadriceps tendon rupture and a medial retinacular tear.

Postoperatively, she did well, is now confined to the hospital.  She is here until

her surgeon states that she can get up and is safe without her knee immobilizer.

She sees her orthopedic surgeon every two weeks. 



Subjectively, the patient states she is doing well.  She slept well last night.  She

has no significant swelling in her legs. 



She is looking forward to physical therapy and occupational therapy again tomorrow.



OBJECTIVE:  VITAL SIGNS:  Revealed blood pressure last night at midnight 133/67,

pulse 83 to 90, respirations 18, O2 saturation 94% to 97% on room air, T-max 98.1. 



PHYSICAL EXAMINATION:

GENERAL:  This is a well-developed, well-nourished, pleasant 68-year-old white

female.  She is in no apparent distress. 

HEENT:  Reveals normocephalic, nontraumatic cranium.  Pupils are equally round and

reactive.  Extraocular movements are intact.  Nose and throat are moist this

morning. 

NECK:  Supple without masses, nodes, or bruits. 

CHEST:  Clear to auscultation.  No rales, rhonchi, or wheezes are noted.  No cough

is noted. 

HEART:  Reveals a regular rate and rhythm without murmurs, gallops, or rubs. 

ABDOMEN:  Soft, nontender without organomegaly.  Normal bowel sounds are noted in

all 4 quadrants.  No rebound or guarding is noted. 

:  Exam is deferred. 

EXTREMITIES:  Reveal left knee immobilizer in place and dressing is in place. 

NEUROLOGIC:  The patient is oriented x3.  No focal findings.



ASSESSMENT:  

1. Left knee status post fall, quadriceps tendon repair, and medial patellofemoral

ligament reconstruction. 

2. Hypertension, well controlled.

3. Bipolar disorder.

4. Obstructive sleep apnea, for which the patient uses her CPAP.

5. Stage 2 chronic kidney disease.

6. Generalized weakness.



PLAN:  

1. Continue to monitor the patient's blood pressure closely.

2. Stress ulcer prophylaxis.

3. Decubitus precautions.

4. Continue to encourage the patient to wear CPAP nightly.

5. Continue PT and OT.

6. Dr. Choudhury will be back tomorrow.







Job ID:  [id number]

## 2019-01-28 RX ADMIN — HYDROCODONE BITARTRATE AND ACETAMINOPHEN PRN TAB: 10; 325 TABLET ORAL at 08:12

## 2019-01-28 RX ADMIN — HYDROCODONE BITARTRATE AND ACETAMINOPHEN PRN TAB: 10; 325 TABLET ORAL at 13:07

## 2019-01-28 NOTE — PRG
DATE OF SERVICE:  01/24/2019



SUBJECTIVE:  The patient feels well, very happy, as the surgeon has confirmed that

she can be on partial weightbearing and stay in the skilled facility.  She has been

eating well and sleeping much better with fair control of her bipolar disorder. 



OBJECTIVE:  VITAL SIGNS:  Temperature 98, pulse 90, respirations 20, O2 sats 95% on

room air, blood pressure 135/78. 

LUNGS:  Clear. 

CARDIAC:  Showed regular rhythm. 

ABDOMEN:  Soft and nontender. 

SKIN AND EXTREMITIES:  Display no edema, clubbing, or cyanosis. 

NEUROLOGICAL:  Intact.  Left leg is in the immobilizer at all times with no pain.



ASSESSMENT:  

1. Resolving left total knee replacement status post fall with revision requiring

after ligament tear and now on knee immobilizer, but with partial weightbearing. 

2. Bipolar disorder, stable.



PLAN:  To continue PT/OT.  Continue pain relief per rehab physician.  Continue

previous bipolar meds.  This patient appears to be doing well. 







Job ID:  [id number]

## 2019-01-29 RX ADMIN — HYDROCODONE BITARTRATE AND ACETAMINOPHEN PRN TAB: 10; 325 TABLET ORAL at 05:15

## 2019-01-29 RX ADMIN — HYDROCODONE BITARTRATE AND ACETAMINOPHEN PRN TAB: 10; 325 TABLET ORAL at 09:24

## 2019-01-29 RX ADMIN — HYDROCODONE BITARTRATE AND ACETAMINOPHEN PRN TAB: 10; 325 TABLET ORAL at 13:57

## 2019-01-29 NOTE — PRG
DATE OF SERVICE:  01/28/2019



SUBJECTIVE:  The patient feels well, has been cooperating well with therapy.  No

complaints.  Feels that her leg is getting stronger and that she is progressing. 



OBJECTIVE:  VITAL SIGNS:  Blood pressure is 136/70, temperature is 98, pulse 74,

respirations 18, O2 sat is 95% on room air. 

LUNGS:  Clear. 

CARDIAC:  Showed regular rhythm. 

ABDOMEN:  Soft and nontender. 

EXTREMITIES:  Left knee is in a knee immobilizer.



ASSESSMENT:  Resolving left total knee, status post fall with subsequent revision

and ligament repair, healing well now and had knee immobilizer, partial

weightbearing, doing well.  She is off her antibiotics at this time and will not

restart until discussed with surgeon. 



PLAN:  

1. Continue PT/OT.

2. Discuss antibiotics with orthopedic surgeon.

3. Continue bipolar medications, she is doing well.







Job ID:  [id number]

## 2019-01-30 RX ADMIN — HYDROCODONE BITARTRATE AND ACETAMINOPHEN PRN TAB: 10; 325 TABLET ORAL at 12:06

## 2019-01-30 RX ADMIN — HYDROCODONE BITARTRATE AND ACETAMINOPHEN PRN TAB: 10; 325 TABLET ORAL at 07:20

## 2019-01-30 NOTE — PRG
DATE OF SERVICE:  01/29/2019



SUBJECTIVE:  The patient feels well, working with therapy, having some pain in her

knee at night, but is cooperating well with therapy with no pain.  She has discussed

her antibiotics with her orthopedic surgeon who recommended the Keflex to be

continued for 1 more week.  She is having no fever or chills.  She is sleeping well

at night and cooperating well with therapy. 



OBJECTIVE:  VITAL SIGNS:  Shows temperature is 96.8, pulse 86, respirations 18, O2

saturations 96% on room air, and blood pressure 133/62. 

EXTREMITIES:  Left knee appears to be healing well in knee immobilizer. 

LUNGS:  Clear. 

CARDIAC:  Shows regular rhythm.



ASSESSMENT:  

1. Resolving left total knee status post fall requiring revision and repair of

ligament. 

2. Stable bipolar disorder.

3. Improving deconditioning.

4. Morbid obesity, stable.



PLAN:  

1. Restart Keflex 750 mg twice daily for 1 more week.

2. Continue PT/OT.

3. Continue prehospitalization bipolar medication.







Job ID:  [id number]

## 2019-01-31 RX ADMIN — HYDROCODONE BITARTRATE AND ACETAMINOPHEN PRN TAB: 10; 325 TABLET ORAL at 11:52

## 2019-01-31 RX ADMIN — HYDROCODONE BITARTRATE AND ACETAMINOPHEN PRN TAB: 10; 325 TABLET ORAL at 07:11

## 2019-01-31 NOTE — PRG
DATE OF SERVICE:  01/30/2019



SUBJECTIVE:  The patient is sitting up in a chair, eating her supper, feels well,

having minimal pain in her leg, but is concerned as she has been told that her

insurance has denied her further stay at [facility] and may need transfer to

[facility].  She is appealing this process. 



OBJECTIVE:  VITAL SIGNS:  Blood pressure is 148/75, temperature is 96, pulse is 88,

respirations 19, O2 saturations 97% on room air. 

LUNGS:  Clear. 

CARDIAC:  Regular rhythm. 

ABDOMEN:  Soft, nontender. 

EXTREMITIES:  Left leg is in knee immobilizer.



ASSESSMENT:  

1. Resolving left total knee, status post fall with required revision for ligament

tear.  No further need for antibiotics after discussion with Orthopedic surgeon. 

2. Bipolar disorder, doing very well on medication.

3. Improving deconditioning.



PLAN:  

1. Discontinue Keflex.

2. Await results of appeal.

3. Prepare for transfer to [facility] for continued care

and then discuss possible return to [facility] for further therapy if

needed once the patient becomes more ambulatory. 







Job ID:  [id number]

## 2019-02-01 RX ADMIN — HYDROCODONE BITARTRATE AND ACETAMINOPHEN PRN TAB: 10; 325 TABLET ORAL at 08:58

## 2019-02-02 RX ADMIN — HYDROCODONE BITARTRATE AND ACETAMINOPHEN PRN TAB: 10; 325 TABLET ORAL at 16:15

## 2019-02-02 NOTE — PRG
DATE OF SERVICE:  



SUBJECTIVE:  Ms. Vieyra had an episode of anxiety and panic attack and required an

extra dose of Klonopin.  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

She normally has the Klonopin b.i.d. p.r.n.  I changed it to t.i.d. and that seems

to be helping.  She is currently laying in bed, resting comfortably and denies any

complaints.  No family at bedside.  Discussed with nursing. 



OBJECTIVE:  VITAL SIGNS:  She is afebrile.  Heart rate 88, respirations 16, oxygen

saturation 98% on room air, blood pressure 137/88. 

CARDIOVASCULAR SYSTEM:  S1, S2 plus. 

RESPIRATORY SYSTEM:  Vesicular breath sounds. 

ABDOMEN:  Soft, obese, nontender.  Bowel sounds heard in all quadrants. 

EXTREMITIES:  Without cyanosis or clubbing.  Peripheral pulses are palpable. 

CENTRAL NERVOUS SYSTEM: Alert, awake, and oriented x3.  Cranial nerves 2 through 12

intact.  Motor system, grossly nonfocal. 



IMPRESSION:  

1. Left knee replacement complicated by two falls resulting in significant trauma.

2. Hypertension.

3. Dyslipidemia.

4. Morbid obesity.

5. Degenerative joint disease.

6. Subjective sleep apnea.

7. Bipolar disorder.

8. Anxiety.

9. Osteoporosis.



PLAN:  

1. Continue Klonopin t.i.d. p.r.n.

2. Heart healthy diet.

3. Monitor blood pressure and adjust medications as needed.

4. Orthopedic precautions.

5. DVT and stress ulcer prophylaxis.

6. Decubitus precaution.

7. Nocturnal CPAP.

8. Routine laboratory values.







Job ID:  [id number]

## 2019-02-03 RX ADMIN — HYDROCODONE BITARTRATE AND ACETAMINOPHEN PRN TAB: 10; 325 TABLET ORAL at 07:01

## 2019-02-03 RX ADMIN — HYDROCODONE BITARTRATE AND ACETAMINOPHEN PRN TAB: 10; 325 TABLET ORAL at 15:47

## 2019-02-04 RX ADMIN — HYDROCODONE BITARTRATE AND ACETAMINOPHEN PRN TAB: 10; 325 TABLET ORAL at 08:24

## 2019-02-04 RX ADMIN — HYDROCODONE BITARTRATE AND ACETAMINOPHEN PRN TAB: 10; 325 TABLET ORAL at 18:01

## 2019-02-04 RX ADMIN — HYDROCODONE BITARTRATE AND ACETAMINOPHEN PRN TAB: 10; 325 TABLET ORAL at 13:10

## 2019-02-04 NOTE — PRG
DATE OF SERVICE:  02/01/2019



SUBJECTIVE:  The patient feels well, working with therapy.  Still awaiting appeal,

but most likely is going to [facility] next week for continued therapy.  She is

having decreasing pain in her knee and is still partial weightbearing, but having

some difficulty being partial weightbearing. 



OBJECTIVE:  VITAL SIGNS:  Show her temperature is 97.5, pulse 77, respirations 18,

O2 saturations 95% on room air, and blood pressure 138/64. 

LUNGS:  Clear. 

CARDIAC:  Regular rhythm. 

ABDOMEN:  Soft and nontender. 

SKIN/EXTREMITIES:  Showed that the left knee is in a knee immobilizer. 

NEUROLOGICAL:  Intact.



ASSESSMENT:  

1. Resolving left total knee replacement, status post fall and revision of ligament

damage, slowly improving, partial weightbearing, but still in knee immobilizer. 

2. Bipolar disorder, stable, controlled with no difficulty and no problems with

compliance. 

3. Improving deconditioning.



PLAN:  

1. Continue PT/OT.

2. Arrange for transfer to [facility] if appeal does not be upheld.







Job ID:  [id number]

## 2019-02-04 NOTE — PRG
DATE OF SERVICE:  02/04/2019



SUBJECTIVE:  The patient feels well, working with therapy, very happy and cheerful,

although she understands most likely she will be going to [facility] to continue

her therapy. 



OBJECTIVE:  VITAL SIGNS:  Blood pressure is 119/59, temperature is 97, pulse 72,

respirations 17, O2 saturations 98% on room air. 

LUNGS:  Clear. 

CARDIAC:  Showed regular rhythm. 

ABDOMEN:  Soft and nontender. 

EXTREMITIES:  Left knee is in knee immobilizer.  The patient is working with therapy

with no complaints. 



ASSESSMENT:  

1. Resolving left total knee replacement, status post fall and revision, requiring

ligament repair. 

2. Stable bipolar.  Good compliance to therapy.



PLAN:  Continue PT and OT.  Continue to monitor for safety and ADLs.







Job ID:  [id number]

## 2019-02-05 RX ADMIN — HYDROCODONE BITARTRATE AND ACETAMINOPHEN PRN TAB: 10; 325 TABLET ORAL at 11:06

## 2019-02-05 RX ADMIN — HYDROCODONE BITARTRATE AND ACETAMINOPHEN PRN TAB: 10; 325 TABLET ORAL at 05:46

## 2019-02-06 VITALS — TEMPERATURE: 97.9 F | SYSTOLIC BLOOD PRESSURE: 117 MMHG | DIASTOLIC BLOOD PRESSURE: 56 MMHG

## 2019-02-06 NOTE — PRG
DATE OF SERVICE:  02/05/2019



SUBJECTIVE:  The patient feels well, cooperating well with therapy.  Has determined

that she is not going to be able to afford to go to [facility] and is going home

with home health care and will follow up with her orthopedic surgeon next week and

then possibly once she is improved, weightbearing may be re-applied to the rehab

hospital for admission. 



OBJECTIVE:  VITAL SIGNS:  Shows her blood pressure is 101/57, temperature is 97,

pulse 78, respirations 20, O2 sats 93% on room air. 

LUNGS:  Clear. 

CARDIAC EXAMINATION:  Regular rhythm. 

EXTREMITIES:  Left knee is in knee immobilizer. 

NEUROLOGICAL:  Intact.



ASSESSMENT:  

1. Stable bipolar disease.

2. Resolving left total knee replacement, status post fall with subsequent revision

of ligament tear.  Still with only partial weightbearing. 



PLAN:  Discharge home in a.m. to follow up with [facility] and with

her orthopedic surgeon, Dr. Milligan, and then subsequent hopefully to reapply for

admission to rehab hospital. 







Job ID:  [id number]

## 2020-06-08 ENCOUNTER — HOSPITAL ENCOUNTER (OUTPATIENT)
Dept: HOSPITAL 92 - BICMAMMO | Age: 70
Discharge: HOME | End: 2020-06-08
Attending: FAMILY MEDICINE
Payer: MEDICARE

## 2020-06-08 DIAGNOSIS — Z80.3: ICD-10-CM

## 2020-06-08 DIAGNOSIS — Z12.31: Primary | ICD-10-CM

## 2020-06-08 PROCEDURE — 77063 BREAST TOMOSYNTHESIS BI: CPT

## 2020-06-08 PROCEDURE — 77067 SCR MAMMO BI INCL CAD: CPT

## 2020-06-08 NOTE — MMO
Bilateral MAMMO Bilat Screen [facility].

 

CLINICAL HISTORY:

Patient is 70 years old and is seen for screening. The patient has the following

family history of breast cancer:  maternal aunt, at age 45; maternal aunt, at

age 45; sister; sister and maternal grandmother.  The patient has no personal

history of cancer.

 

VIEWS:

The views performed were:  bilateral craniocaudal with tomosynthesis and

bilateral mediolateral oblique with tomosynthesis.

 

FILMS COMPARED:

The present examination has been compared to prior imaging studies performed at

[facility] on 03/02/2015, 04/15/2016, 05/23/2018 and 05/24/2019.

 

This study has been interpreted with the assistance of computer-aided detection.

 

MAMMOGRAM FINDINGS:

The breasts are heterogeneously dense, which could obscure a lesion on

mammography.

 

There are stable calcifications seen in both breasts.

 

There are no suspicious masses, suspicious calcifications, or new areas of

architectural distortion.

 

IMPRESSION:

THERE IS NO MAMMOGRAPHIC EVIDENCE OF MALIGNANCY.

 

A ROUTINE FOLLOW-UP MAMMOGRAM IN 1 YEAR IS RECOMMENDED.

 

THE RESULTS OF THIS EXAM WERE SENT TO THE PATIENT.

 

ACR BI-RADS Category 2 - Benign finding

 

MAMMOGRAPHY NOTE:

 1. A negative mammogram report should not delay a biopsy if a dominant of

 clinically suspicious mass is present.

 2. Approximately 10% to 15% of breast cancers are not detected by

 mammography.

 3. Adenosis and dense breasts may obscure an underlying neoplasm.

 

 

Reported by: AUGUSTO BUTLER MD

Electonically Signed: [id number]

## 2021-07-06 ENCOUNTER — HOSPITAL ENCOUNTER (OUTPATIENT)
Dept: HOSPITAL 92 - NM | Age: 71
Discharge: HOME | End: 2021-07-06
Attending: ORTHOPAEDIC SURGERY
Payer: COMMERCIAL

## 2021-07-06 DIAGNOSIS — Z96.641: ICD-10-CM

## 2021-07-06 DIAGNOSIS — Z96.652: ICD-10-CM

## 2021-07-06 DIAGNOSIS — T84.030A: Primary | ICD-10-CM

## 2021-07-06 PROCEDURE — 78315 BONE IMAGING 3 PHASE: CPT

## 2021-07-06 PROCEDURE — A9503 TC99M MEDRONATE: HCPCS

## 2021-08-27 ENCOUNTER — HOSPITAL ENCOUNTER (OUTPATIENT)
Dept: HOSPITAL 92 - BICMAMMO | Age: 71
Discharge: HOME | End: 2021-08-27
Attending: FAMILY MEDICINE
Payer: MEDICARE

## 2021-08-27 DIAGNOSIS — Z12.31: Primary | ICD-10-CM

## 2021-08-27 DIAGNOSIS — Z80.3: ICD-10-CM

## 2021-08-27 PROCEDURE — 77063 BREAST TOMOSYNTHESIS BI: CPT

## 2021-08-27 PROCEDURE — 77067 SCR MAMMO BI INCL CAD: CPT

## 2022-01-11 ENCOUNTER — HOSPITAL ENCOUNTER (EMERGENCY)
Dept: HOSPITAL 18 - NAV ERS | Age: 72
Discharge: HOME | End: 2022-01-11
Payer: MEDICARE

## 2022-01-11 DIAGNOSIS — Z79.899: ICD-10-CM

## 2022-01-11 DIAGNOSIS — N30.00: ICD-10-CM

## 2022-01-11 DIAGNOSIS — E66.9: ICD-10-CM

## 2022-01-11 DIAGNOSIS — I10: ICD-10-CM

## 2022-01-11 DIAGNOSIS — E03.9: ICD-10-CM

## 2022-01-11 DIAGNOSIS — Z87.891: ICD-10-CM

## 2022-01-11 DIAGNOSIS — R00.2: Primary | ICD-10-CM

## 2022-01-11 LAB
ALBUMIN SERPL BCG-MCNC: 4.3 G/DL (ref 3.4–4.8)
ALP SERPL-CCNC: 124 U/L (ref 40–110)
ALT SERPL W P-5'-P-CCNC: 44 U/L (ref 8–55)
ANION GAP SERPL CALC-SCNC: 16 MMOL/L (ref 10–20)
AST SERPL-CCNC: 31 U/L (ref 5–34)
BACTERIA UR QL AUTO: (no result) HPF
BASOPHILS # BLD AUTO: 0.1 THOU/UL (ref 0–0.2)
BASOPHILS NFR BLD AUTO: 0.6 % (ref 0–1)
BILIRUB SERPL-MCNC: 0.2 MG/DL (ref 0.2–1.2)
BUN SERPL-MCNC: 16 MG/DL (ref 9.8–20.1)
CALCIUM SERPL-MCNC: 10.6 MG/DL (ref 7.8–10.44)
CHLORIDE SERPL-SCNC: 102 MMOL/L (ref 98–107)
CO2 SERPL-SCNC: 24 MMOL/L (ref 23–31)
CREAT CL PREDICTED SERPL C-G-VRATE: 0 ML/MIN (ref 70–130)
DRUG SCREEN CUTOFF: (no result)
EOSINOPHIL # BLD AUTO: 0.1 THOU/UL (ref 0–0.7)
EOSINOPHIL NFR BLD AUTO: 0.9 % (ref 0–10)
GLOBULIN SER CALC-MCNC: 3.2 G/DL (ref 2.4–3.5)
GLUCOSE SERPL-MCNC: 111 MG/DL (ref 80–115)
HGB BLD-MCNC: 13.7 G/DL (ref 12–16)
LYMPHOCYTES # BLD AUTO: 2.4 THOU/UL (ref 1.2–3.4)
LYMPHOCYTES NFR BLD AUTO: 20.1 % (ref 21–51)
MCH RBC QN AUTO: 28.2 PG (ref 27–31)
MCV RBC AUTO: 87.9 FL (ref 78–98)
MEDTOX CONTROL LINE VALID?: (no result)
MONOCYTES # BLD AUTO: 0.7 THOU/UL (ref 0.11–0.59)
MONOCYTES NFR BLD AUTO: 5.4 % (ref 0–10)
NEUTROPHILS # BLD AUTO: 8.7 THOU/UL (ref 1.4–6.5)
NEUTROPHILS NFR BLD AUTO: 73 % (ref 42–75)
PLATELET # BLD AUTO: 329 THOU/UL (ref 130–400)
POTASSIUM SERPL-SCNC: 3.3 MMOL/L (ref 3.5–5.1)
PROT UR STRIP.AUTO-MCNC: 30 MG/DL
RBC # BLD AUTO: 4.85 MILL/UL (ref 4.2–5.4)
RBC UR QL AUTO: (no result) HPF (ref 0–3)
SODIUM SERPL-SCNC: 139 MMOL/L (ref 136–145)
SP GR UR STRIP: 1.02 (ref 1–1.03)
WBC # BLD AUTO: 11.9 THOU/UL (ref 4.8–10.8)

## 2022-01-11 PROCEDURE — 80306 DRUG TEST PRSMV INSTRMNT: CPT

## 2022-01-11 PROCEDURE — 93005 ELECTROCARDIOGRAM TRACING: CPT

## 2022-01-11 PROCEDURE — 80053 COMPREHEN METABOLIC PANEL: CPT

## 2022-01-11 PROCEDURE — 81015 MICROSCOPIC EXAM OF URINE: CPT

## 2022-01-11 PROCEDURE — 84484 ASSAY OF TROPONIN QUANT: CPT

## 2022-01-11 PROCEDURE — 71045 X-RAY EXAM CHEST 1 VIEW: CPT

## 2022-01-11 PROCEDURE — 84443 ASSAY THYROID STIM HORMONE: CPT

## 2022-01-11 PROCEDURE — 81003 URINALYSIS AUTO W/O SCOPE: CPT

## 2022-01-11 PROCEDURE — 85025 COMPLETE CBC W/AUTO DIFF WBC: CPT

## 2022-07-08 ENCOUNTER — HOSPITAL ENCOUNTER (OUTPATIENT)
Dept: HOSPITAL 92 - CSHLAB | Age: 72
Discharge: HOME | End: 2022-07-08
Attending: INTERNAL MEDICINE
Payer: MEDICARE

## 2022-07-08 DIAGNOSIS — Z20.822: Primary | ICD-10-CM

## 2022-07-08 DIAGNOSIS — K63.5: ICD-10-CM

## 2022-07-08 PROCEDURE — 87811 SARS-COV-2 COVID19 W/OPTIC: CPT

## 2022-07-11 ENCOUNTER — HOSPITAL ENCOUNTER (OUTPATIENT)
Dept: HOSPITAL 92 - CSHSDC | Age: 72
Discharge: HOME | End: 2022-07-11
Attending: INTERNAL MEDICINE
Payer: MEDICARE

## 2022-07-11 VITALS — BODY MASS INDEX: 40.4 KG/M2

## 2022-07-11 DIAGNOSIS — Z88.2: ICD-10-CM

## 2022-07-11 DIAGNOSIS — Z20.822: ICD-10-CM

## 2022-07-11 DIAGNOSIS — I10: ICD-10-CM

## 2022-07-11 DIAGNOSIS — G47.30: ICD-10-CM

## 2022-07-11 DIAGNOSIS — K64.9: ICD-10-CM

## 2022-07-11 DIAGNOSIS — Z86.010: ICD-10-CM

## 2022-07-11 DIAGNOSIS — K51.40: ICD-10-CM

## 2022-07-11 DIAGNOSIS — E78.5: ICD-10-CM

## 2022-07-11 DIAGNOSIS — Z12.11: Primary | ICD-10-CM

## 2022-07-11 PROCEDURE — 0DBN8ZZ EXCISION OF SIGMOID COLON, VIA NATURAL OR ARTIFICIAL OPENING ENDOSCOPIC: ICD-10-PCS | Performed by: INTERNAL MEDICINE

## 2022-07-11 PROCEDURE — 88305 TISSUE EXAM BY PATHOLOGIST: CPT

## 2022-07-27 ENCOUNTER — HOSPITAL ENCOUNTER (EMERGENCY)
Dept: HOSPITAL 18 - NAV ERS | Age: 72
Discharge: PSYCHIATRIC OR SUBSTANCE USE FACILITY | End: 2022-07-27
Payer: MEDICARE

## 2022-07-27 DIAGNOSIS — E03.9: ICD-10-CM

## 2022-07-27 DIAGNOSIS — G89.29: ICD-10-CM

## 2022-07-27 DIAGNOSIS — Z87.891: ICD-10-CM

## 2022-07-27 DIAGNOSIS — M25.559: ICD-10-CM

## 2022-07-27 DIAGNOSIS — F31.9: Primary | ICD-10-CM

## 2022-07-27 LAB
ALBUMIN SERPL BCG-MCNC: 4.5 G/DL (ref 3.4–4.8)
ALP SERPL-CCNC: 107 U/L (ref 40–110)
ALT SERPL W P-5'-P-CCNC: 30 U/L (ref 8–55)
ANION GAP SERPL CALC-SCNC: 18 MMOL/L (ref 10–20)
APAP SERPL-MCNC: (no result) MCG/ML (ref 10–30)
AST SERPL-CCNC: 24 U/L (ref 5–34)
BASOPHILS # BLD AUTO: 0.1 THOU/UL (ref 0–0.2)
BASOPHILS NFR BLD AUTO: 0.7 % (ref 0–1)
BILIRUB SERPL-MCNC: 0.2 MG/DL (ref 0.2–1.2)
BUN SERPL-MCNC: 19 MG/DL (ref 9.8–20.1)
CALCIUM SERPL-MCNC: 9.8 MG/DL (ref 7.8–10.44)
CHLORIDE SERPL-SCNC: 104 MMOL/L (ref 98–107)
CO2 SERPL-SCNC: 22 MMOL/L (ref 23–31)
CREAT CL PREDICTED SERPL C-G-VRATE: 0 ML/MIN (ref 70–130)
DRUG SCREEN CUTOFF: (no result)
EOSINOPHIL # BLD AUTO: 0.2 THOU/UL (ref 0–0.7)
EOSINOPHIL NFR BLD AUTO: 1.2 % (ref 0–10)
GLOBULIN SER CALC-MCNC: 3.2 G/DL (ref 2.4–3.5)
GLUCOSE SERPL-MCNC: 100 MG/DL (ref 80–115)
HGB BLD-MCNC: 13.4 G/DL (ref 12–16)
LYMPHOCYTES # BLD AUTO: 2.5 THOU/UL (ref 1.2–3.4)
LYMPHOCYTES NFR BLD AUTO: 19.8 % (ref 21–51)
MCH RBC QN AUTO: 27.7 PG (ref 27–31)
MCV RBC AUTO: 89.3 FL (ref 78–98)
MEDTOX CONTROL LINE VALID?: (no result)
MONOCYTES # BLD AUTO: 0.7 THOU/UL (ref 0.11–0.59)
MONOCYTES NFR BLD AUTO: 5.4 % (ref 0–10)
NEUTROPHILS # BLD AUTO: 9.2 THOU/UL (ref 1.4–6.5)
NEUTROPHILS NFR BLD AUTO: 72.9 % (ref 42–75)
PLATELET # BLD AUTO: 361 THOU/UL (ref 130–400)
POTASSIUM SERPL-SCNC: 3.1 MMOL/L (ref 3.5–5.1)
RBC # BLD AUTO: 4.84 MILL/UL (ref 4.2–5.4)
SALICYLATES SERPL-MCNC: (no result) MG/DL (ref 15–30)
SODIUM SERPL-SCNC: 141 MMOL/L (ref 136–145)
SP GR UR STRIP: 1.01 (ref 1–1.03)
WBC # BLD AUTO: 12.6 THOU/UL (ref 4.8–10.8)

## 2022-07-27 PROCEDURE — 80306 DRUG TEST PRSMV INSTRMNT: CPT

## 2022-07-27 PROCEDURE — 36415 COLL VENOUS BLD VENIPUNCTURE: CPT

## 2022-07-27 PROCEDURE — 80053 COMPREHEN METABOLIC PANEL: CPT

## 2022-07-27 PROCEDURE — 84443 ASSAY THYROID STIM HORMONE: CPT

## 2022-07-27 PROCEDURE — 81003 URINALYSIS AUTO W/O SCOPE: CPT

## 2022-07-27 PROCEDURE — 85025 COMPLETE CBC W/AUTO DIFF WBC: CPT

## 2022-07-27 PROCEDURE — 71045 X-RAY EXAM CHEST 1 VIEW: CPT

## 2022-07-27 PROCEDURE — 80307 DRUG TEST PRSMV CHEM ANLYZR: CPT

## 2022-07-27 PROCEDURE — 94760 N-INVAS EAR/PLS OXIMETRY 1: CPT

## 2022-10-19 ENCOUNTER — HOSPITAL ENCOUNTER (OUTPATIENT)
Dept: HOSPITAL 92 - BICMAMMO | Age: 72
Discharge: HOME | End: 2022-10-19
Attending: FAMILY MEDICINE
Payer: COMMERCIAL

## 2022-10-19 DIAGNOSIS — Z12.31: Primary | ICD-10-CM

## 2022-10-19 DIAGNOSIS — N63.10: ICD-10-CM

## 2022-10-19 DIAGNOSIS — Z80.3: ICD-10-CM

## 2022-10-19 PROCEDURE — 77067 SCR MAMMO BI INCL CAD: CPT

## 2022-10-19 PROCEDURE — 77063 BREAST TOMOSYNTHESIS BI: CPT

## 2022-10-26 ENCOUNTER — HOSPITAL ENCOUNTER (OUTPATIENT)
Dept: HOSPITAL 92 - BICMAMMO | Age: 72
Discharge: HOME | End: 2022-10-26
Attending: FAMILY MEDICINE
Payer: COMMERCIAL

## 2022-10-26 DIAGNOSIS — N63.15: Primary | ICD-10-CM

## 2022-10-26 PROCEDURE — 76642 ULTRASOUND BREAST LIMITED: CPT

## 2022-10-26 PROCEDURE — G0279 TOMOSYNTHESIS, MAMMO: HCPCS

## 2022-10-26 PROCEDURE — 77065 DX MAMMO INCL CAD UNI: CPT

## 2023-01-11 ENCOUNTER — HOSPITAL ENCOUNTER (OUTPATIENT)
Dept: HOSPITAL 92 - LABBT | Age: 73
Discharge: HOME | End: 2023-01-11
Attending: ORTHOPAEDIC SURGERY
Payer: MEDICARE

## 2023-01-11 ENCOUNTER — HOSPITAL ENCOUNTER (INPATIENT)
Dept: HOSPITAL 92 - SURG A | Age: 73
LOS: 6 days | Discharge: NURSING, REHAB OR LONG-TERM CARE | DRG: 468 | End: 2023-01-17
Attending: ORTHOPAEDIC SURGERY | Admitting: ORTHOPAEDIC SURGERY
Payer: COMMERCIAL

## 2023-01-11 VITALS — BODY MASS INDEX: 33.5 KG/M2

## 2023-01-11 DIAGNOSIS — Z01.818: Primary | ICD-10-CM

## 2023-01-11 DIAGNOSIS — Z90.49: ICD-10-CM
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
## 2023-01-11 DIAGNOSIS — F31.9: ICD-10-CM

## 2023-01-11 DIAGNOSIS — Z20.822: ICD-10-CM

## 2023-01-11 DIAGNOSIS — T84.010A: Primary | ICD-10-CM

## 2023-01-11 DIAGNOSIS — T84.010A: ICD-10-CM

## 2023-01-11 DIAGNOSIS — Z88.8: ICD-10-CM

## 2023-01-11 DIAGNOSIS — Z96.652: ICD-10-CM

## 2023-01-11 DIAGNOSIS — F98.8: ICD-10-CM

## 2023-01-11 DIAGNOSIS — Z79.899: ICD-10-CM

## 2023-01-11 DIAGNOSIS — Z88.1: ICD-10-CM

## 2023-01-11 LAB
ANION GAP SERPL CALC-SCNC: 19 MMOL/L (ref 10–20)
BASOPHILS # BLD AUTO: 0.1 10X3/UL (ref 0–0.2)
BASOPHILS NFR BLD AUTO: 0.5 % (ref 0–2)
BUN SERPL-MCNC: 16 MG/DL (ref 9.8–20.1)
CALCIUM SERPL-MCNC: 9.7 MG/DL (ref 7.8–10.44)
CHLORIDE SERPL-SCNC: 101 MMOL/L (ref 98–107)
CO2 SERPL-SCNC: 24 MMOL/L (ref 23–31)
CREAT CL PREDICTED SERPL C-G-VRATE: 0 ML/MIN (ref 70–130)
EOSINOPHIL # BLD AUTO: 0.2 10X3/UL (ref 0–0.5)
EOSINOPHIL NFR BLD AUTO: 1.6 % (ref 0–6)
GLUCOSE SERPL-MCNC: 94 MG/DL (ref 80–115)
HGB BLD-MCNC: 13.6 G/DL (ref 12–15.5)
INR PPP: 0.9
LYMPHOCYTES NFR BLD AUTO: 18.1 % (ref 18–47)
MCH RBC QN AUTO: 28.2 PG (ref 27–33)
MCV RBC AUTO: 84 FL (ref 81.6–98.3)
MONOCYTES # BLD AUTO: 0.8 10X3/UL (ref 0–1.1)
MONOCYTES NFR BLD AUTO: 6.6 % (ref 0–10)
NEUTROPHILS # BLD AUTO: 8.5 10X3/UL (ref 1.5–8.4)
NEUTROPHILS NFR BLD AUTO: 71.5 % (ref 40–75)
PLATELET # BLD AUTO: 419 10X3/UL (ref 150–450)
POTASSIUM SERPL-SCNC: 3.3 MMOL/L (ref 3.5–5.1)
PROTHROMBIN TIME: 10.3 SEC (ref 9.5–12.1)
RBC # BLD AUTO: 4.82 10X6/UL (ref 3.9–5.03)
SODIUM SERPL-SCNC: 141 MMOL/L (ref 136–145)
WBC # BLD AUTO: 11.9 10X3/UL (ref 3.5–10.5)

## 2023-01-11 PROCEDURE — 85025 COMPLETE CBC W/AUTO DIFF WBC: CPT

## 2023-01-11 PROCEDURE — 85027 COMPLETE CBC AUTOMATED: CPT

## 2023-01-11 PROCEDURE — 87081 CULTURE SCREEN ONLY: CPT

## 2023-01-11 PROCEDURE — C1776 JOINT DEVICE (IMPLANTABLE): HCPCS

## 2023-01-11 PROCEDURE — 36415 COLL VENOUS BLD VENIPUNCTURE: CPT

## 2023-01-11 PROCEDURE — C1713 ANCHOR/SCREW BN/BN,TIS/BN: HCPCS

## 2023-01-11 PROCEDURE — 85610 PROTHROMBIN TIME: CPT

## 2023-01-11 PROCEDURE — 93010 ELECTROCARDIOGRAM REPORT: CPT

## 2023-01-11 PROCEDURE — U0002 COVID-19 LAB TEST NON-CDC: HCPCS

## 2023-01-11 PROCEDURE — 93005 ELECTROCARDIOGRAM TRACING: CPT

## 2023-01-11 PROCEDURE — S0020 INJECTION, BUPIVICAINE HYDRO: HCPCS

## 2023-01-11 PROCEDURE — 80048 BASIC METABOLIC PNL TOTAL CA: CPT

## 2023-01-12 PROCEDURE — 3E0T3BZ INTRODUCTION OF ANESTHETIC AGENT INTO PERIPHERAL NERVES AND PLEXI, PERCUTANEOUS APPROACH: ICD-10-PCS | Performed by: ORTHOPAEDIC SURGERY

## 2023-01-12 PROCEDURE — 0SPA0JZ REMOVAL OF SYNTHETIC SUBSTITUTE FROM RIGHT HIP JOINT, ACETABULAR SURFACE, OPEN APPROACH: ICD-10-PCS | Performed by: ORTHOPAEDIC SURGERY

## 2023-01-12 PROCEDURE — 0SRA0JA REPLACEMENT OF RIGHT HIP JOINT, ACETABULAR SURFACE WITH SYNTHETIC SUBSTITUTE, UNCEMENTED, OPEN APPROACH: ICD-10-PCS | Performed by: ORTHOPAEDIC SURGERY

## 2023-01-12 RX ADMIN — HYDROCODONE BITARTRATE AND ACETAMINOPHEN PRN TAB: 10; 325 TABLET ORAL at 21:29

## 2023-01-12 RX ADMIN — ASPIRIN SCH MG: 81 TABLET ORAL at 21:29

## 2023-01-12 RX ADMIN — HYDROCODONE BITARTRATE AND ACETAMINOPHEN PRN TAB: 10; 325 TABLET ORAL at 17:02

## 2023-01-12 RX ADMIN — ASPIRIN SCH: 81 TABLET ORAL at 15:30

## 2023-01-13 LAB
HGB BLD-MCNC: 10.4 G/DL (ref 12–16)
MCH RBC QN AUTO: 29.4 PG (ref 27–31)
MCV RBC AUTO: 88 FL (ref 78–98)
PLATELET # BLD AUTO: 323 10X3/UL (ref 130–400)
RBC # BLD AUTO: 3.52 MILL/UL (ref 4.2–5.4)
WBC # BLD AUTO: 12.7 10X3/UL (ref 4.8–10.8)

## 2023-01-13 RX ADMIN — HYDROCODONE BITARTRATE AND ACETAMINOPHEN PRN TAB: 10; 325 TABLET ORAL at 20:58

## 2023-01-13 RX ADMIN — DOCUSATE SODIUM 50 MG AND SENNOSIDES 8.6 MG SCH TAB: 8.6; 5 TABLET, FILM COATED ORAL at 09:25

## 2023-01-13 RX ADMIN — HYDROCODONE BITARTRATE AND ACETAMINOPHEN PRN TAB: 10; 325 TABLET ORAL at 15:36

## 2023-01-13 RX ADMIN — DOCUSATE SODIUM 50 MG AND SENNOSIDES 8.6 MG SCH TAB: 8.6; 5 TABLET, FILM COATED ORAL at 20:58

## 2023-01-13 RX ADMIN — ASPIRIN SCH MG: 81 TABLET ORAL at 09:25

## 2023-01-13 RX ADMIN — HYDROCODONE BITARTRATE AND ACETAMINOPHEN PRN TAB: 10; 325 TABLET ORAL at 09:25

## 2023-01-13 RX ADMIN — MULTIPLE VITAMINS W/ MINERALS TAB SCH TAB: TAB at 09:26

## 2023-01-13 RX ADMIN — ASPIRIN SCH MG: 81 TABLET ORAL at 20:58

## 2023-01-13 RX ADMIN — HYDROCODONE BITARTRATE AND ACETAMINOPHEN PRN TAB: 10; 325 TABLET ORAL at 03:04

## 2023-01-14 LAB
HGB BLD-MCNC: 10.1 G/DL (ref 12–16)
MCH RBC QN AUTO: 29.5 PG (ref 27–31)
MCV RBC AUTO: 86.8 FL (ref 78–98)
PLATELET # BLD AUTO: 323 10X3/UL (ref 130–400)
RBC # BLD AUTO: 3.42 MILL/UL (ref 4.2–5.4)
WBC # BLD AUTO: 13.7 10X3/UL (ref 4.8–10.8)

## 2023-01-14 RX ADMIN — HYDROCODONE BITARTRATE AND ACETAMINOPHEN PRN TAB: 10; 325 TABLET ORAL at 17:17

## 2023-01-14 RX ADMIN — DOCUSATE SODIUM 50 MG AND SENNOSIDES 8.6 MG SCH TAB: 8.6; 5 TABLET, FILM COATED ORAL at 08:33

## 2023-01-14 RX ADMIN — DOCUSATE SODIUM 50 MG AND SENNOSIDES 8.6 MG SCH TAB: 8.6; 5 TABLET, FILM COATED ORAL at 22:25

## 2023-01-14 RX ADMIN — ASPIRIN SCH MG: 81 TABLET ORAL at 08:33

## 2023-01-14 RX ADMIN — HYDROCODONE BITARTRATE AND ACETAMINOPHEN PRN TAB: 10; 325 TABLET ORAL at 08:34

## 2023-01-14 RX ADMIN — ASPIRIN SCH MG: 81 TABLET ORAL at 22:22

## 2023-01-14 RX ADMIN — MULTIPLE VITAMINS W/ MINERALS TAB SCH TAB: TAB at 08:34

## 2023-01-14 RX ADMIN — HYDROCODONE BITARTRATE AND ACETAMINOPHEN PRN TAB: 10; 325 TABLET ORAL at 22:26

## 2023-01-14 RX ADMIN — HYDROCODONE BITARTRATE AND ACETAMINOPHEN PRN TAB: 10; 325 TABLET ORAL at 03:43

## 2023-01-14 RX ADMIN — HYDROCODONE BITARTRATE AND ACETAMINOPHEN PRN TAB: 10; 325 TABLET ORAL at 13:38

## 2023-01-15 RX ADMIN — HYDROCODONE BITARTRATE AND ACETAMINOPHEN PRN TAB: 10; 325 TABLET ORAL at 18:23

## 2023-01-15 RX ADMIN — HYDROCODONE BITARTRATE AND ACETAMINOPHEN PRN TAB: 10; 325 TABLET ORAL at 02:26

## 2023-01-15 RX ADMIN — DOCUSATE SODIUM 50 MG AND SENNOSIDES 8.6 MG SCH TAB: 8.6; 5 TABLET, FILM COATED ORAL at 08:51

## 2023-01-15 RX ADMIN — HYDROCODONE BITARTRATE AND ACETAMINOPHEN PRN TAB: 10; 325 TABLET ORAL at 08:53

## 2023-01-15 RX ADMIN — DOCUSATE SODIUM 50 MG AND SENNOSIDES 8.6 MG SCH TAB: 8.6; 5 TABLET, FILM COATED ORAL at 21:05

## 2023-01-15 RX ADMIN — ASPIRIN SCH MG: 81 TABLET ORAL at 21:05

## 2023-01-15 RX ADMIN — HYDROCODONE BITARTRATE AND ACETAMINOPHEN PRN TAB: 10; 325 TABLET ORAL at 22:38

## 2023-01-15 RX ADMIN — ASPIRIN SCH MG: 81 TABLET ORAL at 08:50

## 2023-01-15 RX ADMIN — MULTIPLE VITAMINS W/ MINERALS TAB SCH TAB: TAB at 08:51

## 2023-01-15 RX ADMIN — HYDROCODONE BITARTRATE AND ACETAMINOPHEN PRN TAB: 10; 325 TABLET ORAL at 16:26

## 2023-01-15 RX ADMIN — HYDROCODONE BITARTRATE AND ACETAMINOPHEN PRN TAB: 10; 325 TABLET ORAL at 06:25

## 2023-01-16 RX ADMIN — DOCUSATE SODIUM 50 MG AND SENNOSIDES 8.6 MG SCH TAB: 8.6; 5 TABLET, FILM COATED ORAL at 20:31

## 2023-01-16 RX ADMIN — HYDROCODONE BITARTRATE AND ACETAMINOPHEN PRN TAB: 10; 325 TABLET ORAL at 16:38

## 2023-01-16 RX ADMIN — HYDROCODONE BITARTRATE AND ACETAMINOPHEN PRN TAB: 10; 325 TABLET ORAL at 20:30

## 2023-01-16 RX ADMIN — DOCUSATE SODIUM 50 MG AND SENNOSIDES 8.6 MG SCH TAB: 8.6; 5 TABLET, FILM COATED ORAL at 07:56

## 2023-01-16 RX ADMIN — HYDROCODONE BITARTRATE AND ACETAMINOPHEN PRN TAB: 10; 325 TABLET ORAL at 09:54

## 2023-01-16 RX ADMIN — ASPIRIN SCH MG: 81 TABLET ORAL at 20:31

## 2023-01-16 RX ADMIN — HYDROCODONE BITARTRATE AND ACETAMINOPHEN PRN TAB: 10; 325 TABLET ORAL at 04:45

## 2023-01-16 RX ADMIN — ASPIRIN SCH MG: 81 TABLET ORAL at 07:56

## 2023-01-16 RX ADMIN — MULTIPLE VITAMINS W/ MINERALS TAB SCH TAB: TAB at 07:56

## 2023-01-17 ENCOUNTER — HOSPITAL ENCOUNTER (INPATIENT)
Dept: HOSPITAL 18 - NAV ACUTE | Age: 73
LOS: 12 days | Discharge: HOME HEALTH | DRG: 561 | End: 2023-01-29
Payer: COMMERCIAL

## 2023-01-17 VITALS — SYSTOLIC BLOOD PRESSURE: 123 MMHG | DIASTOLIC BLOOD PRESSURE: 79 MMHG

## 2023-01-17 VITALS — TEMPERATURE: 97.8 F

## 2023-01-17 VITALS — BODY MASS INDEX: 38.2 KG/M2

## 2023-01-17 DIAGNOSIS — Z90.49: ICD-10-CM

## 2023-01-17 DIAGNOSIS — E87.6: ICD-10-CM

## 2023-01-17 DIAGNOSIS — M19.90: ICD-10-CM

## 2023-01-17 DIAGNOSIS — E03.9: ICD-10-CM

## 2023-01-17 DIAGNOSIS — D64.9: ICD-10-CM

## 2023-01-17 DIAGNOSIS — E66.9: ICD-10-CM

## 2023-01-17 DIAGNOSIS — K59.00: ICD-10-CM

## 2023-01-17 DIAGNOSIS — Z20.822: ICD-10-CM

## 2023-01-17 DIAGNOSIS — D75.839: ICD-10-CM

## 2023-01-17 DIAGNOSIS — Z47.1: Primary | ICD-10-CM

## 2023-01-17 DIAGNOSIS — E86.0: ICD-10-CM

## 2023-01-17 DIAGNOSIS — I10: ICD-10-CM

## 2023-01-17 DIAGNOSIS — Z98.890: ICD-10-CM

## 2023-01-17 DIAGNOSIS — Z88.8: ICD-10-CM

## 2023-01-17 DIAGNOSIS — Z96.641: ICD-10-CM

## 2023-01-17 DIAGNOSIS — F31.9: ICD-10-CM

## 2023-01-17 DIAGNOSIS — E78.5: ICD-10-CM

## 2023-01-17 PROCEDURE — 80048 BASIC METABOLIC PNL TOTAL CA: CPT

## 2023-01-17 PROCEDURE — 85025 COMPLETE CBC W/AUTO DIFF WBC: CPT

## 2023-01-17 PROCEDURE — 87811 SARS-COV-2 COVID19 W/OPTIC: CPT

## 2023-01-17 PROCEDURE — 36415 COLL VENOUS BLD VENIPUNCTURE: CPT

## 2023-01-17 PROCEDURE — 84443 ASSAY THYROID STIM HORMONE: CPT

## 2023-01-17 PROCEDURE — 80053 COMPREHEN METABOLIC PANEL: CPT

## 2023-01-17 PROCEDURE — 83735 ASSAY OF MAGNESIUM: CPT

## 2023-01-17 RX ADMIN — HYDROCODONE BITARTRATE AND ACETAMINOPHEN PRN TAB: 10; 325 TABLET ORAL at 00:32

## 2023-01-17 RX ADMIN — ASPIRIN SCH MG: 81 TABLET ORAL at 08:29

## 2023-01-17 RX ADMIN — HYDROCODONE BITARTRATE AND ACETAMINOPHEN PRN TAB: 10; 325 TABLET ORAL at 09:54

## 2023-01-17 RX ADMIN — MULTIPLE VITAMINS W/ MINERALS TAB SCH TAB: TAB at 08:29

## 2023-01-17 RX ADMIN — HYDROCODONE BITARTRATE AND ACETAMINOPHEN PRN TAB: 5; 325 TABLET ORAL at 17:04

## 2023-01-17 RX ADMIN — HYDROCODONE BITARTRATE AND ACETAMINOPHEN PRN TAB: 5; 325 TABLET ORAL at 21:00

## 2023-01-17 RX ADMIN — DOCUSATE SODIUM 50 MG AND SENNOSIDES 8.6 MG PRN TAB: 8.6; 5 TABLET, FILM COATED ORAL at 20:55

## 2023-01-17 RX ADMIN — HYDROCODONE BITARTRATE AND ACETAMINOPHEN PRN TAB: 10; 325 TABLET ORAL at 05:48

## 2023-01-17 RX ADMIN — DOCUSATE SODIUM 50 MG AND SENNOSIDES 8.6 MG SCH TAB: 8.6; 5 TABLET, FILM COATED ORAL at 08:29

## 2023-01-17 RX ADMIN — HYDROCODONE BITARTRATE AND ACETAMINOPHEN PRN TAB: 5; 325 TABLET ORAL at 21:40

## 2023-01-17 RX ADMIN — ASPIRIN SCH MG: 81 TABLET ORAL at 20:56

## 2023-01-18 LAB
ALBUMIN SERPL BCG-MCNC: 3.7 G/DL (ref 3.4–4.8)
ALP SERPL-CCNC: 115 U/L (ref 40–110)
ALT SERPL W P-5'-P-CCNC: 27 U/L (ref 8–55)
ANION GAP SERPL CALC-SCNC: 15 MMOL/L (ref 10–20)
ANION GAP SERPL CALC-SCNC: 18 MMOL/L (ref 10–20)
AST SERPL-CCNC: 19 U/L (ref 5–34)
BASOPHILS # BLD AUTO: 0.1 THOU/UL (ref 0–0.2)
BASOPHILS NFR BLD AUTO: 0.8 % (ref 0–1)
BILIRUB SERPL-MCNC: 0.4 MG/DL (ref 0.2–1.2)
BUN SERPL-MCNC: 14 MG/DL (ref 9.8–20.1)
BUN SERPL-MCNC: 15 MG/DL (ref 9.8–20.1)
CALCIUM SERPL-MCNC: 10.1 MG/DL (ref 7.8–10.44)
CALCIUM SERPL-MCNC: 9.6 MG/DL (ref 7.8–10.44)
CHLORIDE SERPL-SCNC: 101 MMOL/L (ref 98–107)
CHLORIDE SERPL-SCNC: 98 MMOL/L (ref 98–107)
CO2 SERPL-SCNC: 25 MMOL/L (ref 23–31)
CO2 SERPL-SCNC: 27 MMOL/L (ref 23–31)
CREAT CL PREDICTED SERPL C-G-VRATE: 107 ML/MIN (ref 70–130)
CREAT CL PREDICTED SERPL C-G-VRATE: 94 ML/MIN (ref 70–130)
EOSINOPHIL # BLD AUTO: 0.3 THOU/UL (ref 0–0.7)
EOSINOPHIL NFR BLD AUTO: 2.2 % (ref 0–10)
GLOBULIN SER CALC-MCNC: 3.1 G/DL (ref 2.4–3.5)
GLUCOSE SERPL-MCNC: 100 MG/DL (ref 80–115)
GLUCOSE SERPL-MCNC: 110 MG/DL (ref 80–115)
HGB BLD-MCNC: 10.4 G/DL (ref 12–16)
LYMPHOCYTES # BLD AUTO: 2.1 THOU/UL (ref 1.2–3.4)
LYMPHOCYTES NFR BLD AUTO: 17.4 % (ref 21–51)
MAGNESIUM SERPL-MCNC: 1.6 MG/DL (ref 1.6–2.6)
MCH RBC QN AUTO: 28.5 PG (ref 27–31)
MCV RBC AUTO: 87.9 FL (ref 78–98)
MONOCYTES # BLD AUTO: 0.8 THOU/UL (ref 0.11–0.59)
MONOCYTES NFR BLD AUTO: 6.7 % (ref 0–10)
NEUTROPHILS # BLD AUTO: 8.8 THOU/UL (ref 1.4–6.5)
NEUTROPHILS NFR BLD AUTO: 72.9 % (ref 42–75)
PLATELET # BLD AUTO: 515 10X3/UL (ref 130–400)
POTASSIUM SERPL-SCNC: 2.9 MMOL/L (ref 3.5–5.1)
POTASSIUM SERPL-SCNC: 3.5 MMOL/L (ref 3.5–5.1)
RBC # BLD AUTO: 3.65 MILL/UL (ref 4.2–5.4)
SODIUM SERPL-SCNC: 137 MMOL/L (ref 136–145)
SODIUM SERPL-SCNC: 140 MMOL/L (ref 136–145)
WBC # BLD AUTO: 12.1 10X3/UL (ref 4.8–10.8)

## 2023-01-18 RX ADMIN — DOCUSATE SODIUM 50 MG AND SENNOSIDES 8.6 MG PRN TAB: 8.6; 5 TABLET, FILM COATED ORAL at 09:23

## 2023-01-18 RX ADMIN — HYDROCODONE BITARTRATE AND ACETAMINOPHEN PRN TAB: 5; 325 TABLET ORAL at 21:18

## 2023-01-18 RX ADMIN — ASPIRIN SCH MG: 81 TABLET ORAL at 21:18

## 2023-01-18 RX ADMIN — ASPIRIN SCH MG: 81 TABLET ORAL at 09:16

## 2023-01-18 RX ADMIN — HYDROCODONE BITARTRATE AND ACETAMINOPHEN PRN TAB: 5; 325 TABLET ORAL at 09:13

## 2023-01-18 RX ADMIN — HYDROCODONE BITARTRATE AND ACETAMINOPHEN PRN TAB: 5; 325 TABLET ORAL at 13:22

## 2023-01-18 RX ADMIN — HYDROCODONE BITARTRATE AND ACETAMINOPHEN PRN TAB: 5; 325 TABLET ORAL at 17:35

## 2023-01-19 LAB
ANION GAP SERPL CALC-SCNC: 15 MMOL/L (ref 10–20)
BASOPHILS # BLD AUTO: 0.1 10X3/UL (ref 0–0.2)
BASOPHILS NFR BLD AUTO: 0.5 % (ref 0–2)
BUN SERPL-MCNC: 17 MG/DL (ref 9.8–20.1)
CALCIUM SERPL-MCNC: 9 MG/DL (ref 7.8–10.44)
CHLORIDE SERPL-SCNC: 103 MMOL/L (ref 98–107)
CO2 SERPL-SCNC: 26 MMOL/L (ref 23–31)
CREAT CL PREDICTED SERPL C-G-VRATE: 99 ML/MIN (ref 70–130)
EOSINOPHIL # BLD AUTO: 0.2 10X3/UL (ref 0–0.5)
EOSINOPHIL NFR BLD AUTO: 2 % (ref 0–6)
GLUCOSE SERPL-MCNC: 98 MG/DL (ref 80–115)
HGB BLD-MCNC: 10.2 G/DL (ref 12–15.5)
LYMPHOCYTES NFR BLD AUTO: 23.9 % (ref 18–47)
MCH RBC QN AUTO: 28.6 PG (ref 27–33)
MCV RBC AUTO: 85.4 FL (ref 81.6–98.3)
MONOCYTES # BLD AUTO: 0.7 10X3/UL (ref 0–1.1)
MONOCYTES NFR BLD AUTO: 6.3 % (ref 0–10)
NEUTROPHILS # BLD AUTO: 7.6 10X3/UL (ref 1.5–8.4)
NEUTROPHILS NFR BLD AUTO: 64.6 % (ref 40–75)
PLATELET # BLD AUTO: 586 10X3/UL (ref 150–450)
POTASSIUM SERPL-SCNC: 3.1 MMOL/L (ref 3.5–5.1)
RBC # BLD AUTO: 3.57 10X6/UL (ref 3.9–5.03)
SODIUM SERPL-SCNC: 141 MMOL/L (ref 136–145)
WBC # BLD AUTO: 11.7 10X3/UL (ref 3.5–10.5)

## 2023-01-19 RX ADMIN — HYDROCODONE BITARTRATE AND ACETAMINOPHEN PRN TAB: 5; 325 TABLET ORAL at 22:12

## 2023-01-19 RX ADMIN — ASPIRIN SCH MG: 81 TABLET ORAL at 08:50

## 2023-01-19 RX ADMIN — HYDROCODONE BITARTRATE AND ACETAMINOPHEN PRN TAB: 5; 325 TABLET ORAL at 14:02

## 2023-01-19 RX ADMIN — HYDROCODONE BITARTRATE AND ACETAMINOPHEN PRN TAB: 5; 325 TABLET ORAL at 05:23

## 2023-01-19 RX ADMIN — HYDROCODONE BITARTRATE AND ACETAMINOPHEN PRN TAB: 5; 325 TABLET ORAL at 18:00

## 2023-01-19 RX ADMIN — HYDROCODONE BITARTRATE AND ACETAMINOPHEN PRN TAB: 5; 325 TABLET ORAL at 10:06

## 2023-01-19 RX ADMIN — ASPIRIN SCH MG: 81 TABLET ORAL at 20:43

## 2023-01-19 RX ADMIN — DOCUSATE SODIUM 50 MG AND SENNOSIDES 8.6 MG PRN TAB: 8.6; 5 TABLET, FILM COATED ORAL at 10:05

## 2023-01-20 LAB
ANION GAP SERPL CALC-SCNC: 13 MMOL/L (ref 10–20)
BUN SERPL-MCNC: 19 MG/DL (ref 9.8–20.1)
CALCIUM SERPL-MCNC: 8.7 MG/DL (ref 7.8–10.44)
CHLORIDE SERPL-SCNC: 104 MMOL/L (ref 98–107)
CO2 SERPL-SCNC: 26 MMOL/L (ref 23–31)
CREAT CL PREDICTED SERPL C-G-VRATE: 102 ML/MIN (ref 70–130)
GLUCOSE SERPL-MCNC: 105 MG/DL (ref 80–115)
POTASSIUM SERPL-SCNC: 3.1 MMOL/L (ref 3.5–5.1)
SODIUM SERPL-SCNC: 140 MMOL/L (ref 136–145)

## 2023-01-20 RX ADMIN — HYDROCODONE BITARTRATE AND ACETAMINOPHEN PRN TAB: 5; 325 TABLET ORAL at 05:12

## 2023-01-20 RX ADMIN — HYDROCODONE BITARTRATE AND ACETAMINOPHEN PRN TAB: 5; 325 TABLET ORAL at 09:39

## 2023-01-20 RX ADMIN — ASPIRIN SCH MG: 81 TABLET ORAL at 09:41

## 2023-01-20 RX ADMIN — HYDROCODONE BITARTRATE AND ACETAMINOPHEN PRN TAB: 5; 325 TABLET ORAL at 22:05

## 2023-01-20 RX ADMIN — ASPIRIN SCH MG: 81 TABLET ORAL at 20:11

## 2023-01-20 RX ADMIN — HYDROCODONE BITARTRATE AND ACETAMINOPHEN PRN TAB: 5; 325 TABLET ORAL at 13:45

## 2023-01-20 RX ADMIN — HYDROCODONE BITARTRATE AND ACETAMINOPHEN PRN TAB: 5; 325 TABLET ORAL at 17:53

## 2023-01-21 LAB
ANION GAP SERPL CALC-SCNC: 15 MMOL/L (ref 10–20)
BASOPHILS # BLD AUTO: 0.1 THOU/UL (ref 0–0.2)
BASOPHILS NFR BLD AUTO: 0.8 % (ref 0–1)
BUN SERPL-MCNC: 16 MG/DL (ref 9.8–20.1)
CALCIUM SERPL-MCNC: 9 MG/DL (ref 7.8–10.44)
CHLORIDE SERPL-SCNC: 105 MMOL/L (ref 98–107)
CO2 SERPL-SCNC: 24 MMOL/L (ref 23–31)
CREAT CL PREDICTED SERPL C-G-VRATE: 110 ML/MIN (ref 70–130)
EOSINOPHIL # BLD AUTO: 0.2 THOU/UL (ref 0–0.7)
EOSINOPHIL NFR BLD AUTO: 2.1 % (ref 0–10)
GLUCOSE SERPL-MCNC: 91 MG/DL (ref 80–115)
HGB BLD-MCNC: 10.4 G/DL (ref 12–16)
LYMPHOCYTES # BLD AUTO: 2.4 THOU/UL (ref 1.2–3.4)
LYMPHOCYTES NFR BLD AUTO: 22.4 % (ref 21–51)
MAGNESIUM SERPL-MCNC: 2 MG/DL (ref 1.6–2.6)
MCH RBC QN AUTO: 28.1 PG (ref 27–31)
MCV RBC AUTO: 89.7 FL (ref 78–98)
MONOCYTES # BLD AUTO: 0.7 THOU/UL (ref 0.11–0.59)
MONOCYTES NFR BLD AUTO: 6.6 % (ref 0–10)
NEUTROPHILS # BLD AUTO: 7.3 THOU/UL (ref 1.4–6.5)
NEUTROPHILS NFR BLD AUTO: 68 % (ref 42–75)
PLATELET # BLD AUTO: 567 10X3/UL (ref 130–400)
POTASSIUM SERPL-SCNC: 3.6 MMOL/L (ref 3.5–5.1)
RBC # BLD AUTO: 3.69 MILL/UL (ref 4.2–5.4)
SODIUM SERPL-SCNC: 140 MMOL/L (ref 136–145)
WBC # BLD AUTO: 10.7 10X3/UL (ref 4.8–10.8)

## 2023-01-21 RX ADMIN — HYDROCODONE BITARTRATE AND ACETAMINOPHEN PRN TAB: 5; 325 TABLET ORAL at 16:48

## 2023-01-21 RX ADMIN — ASPIRIN SCH MG: 81 TABLET ORAL at 08:24

## 2023-01-21 RX ADMIN — DOCUSATE SODIUM 50 MG AND SENNOSIDES 8.6 MG PRN TAB: 8.6; 5 TABLET, FILM COATED ORAL at 08:29

## 2023-01-21 RX ADMIN — HYDROCODONE BITARTRATE AND ACETAMINOPHEN PRN TAB: 5; 325 TABLET ORAL at 20:45

## 2023-01-21 RX ADMIN — HYDROCODONE BITARTRATE AND ACETAMINOPHEN PRN TAB: 5; 325 TABLET ORAL at 08:25

## 2023-01-21 RX ADMIN — HYDROCODONE BITARTRATE AND ACETAMINOPHEN PRN TAB: 5; 325 TABLET ORAL at 03:09

## 2023-01-21 RX ADMIN — HYDROCODONE BITARTRATE AND ACETAMINOPHEN PRN TAB: 5; 325 TABLET ORAL at 12:35

## 2023-01-21 RX ADMIN — ASPIRIN SCH MG: 81 TABLET ORAL at 20:46

## 2023-01-22 LAB
ANION GAP SERPL CALC-SCNC: 15 MMOL/L (ref 10–20)
BASOPHILS # BLD AUTO: 0.1 THOU/UL (ref 0–0.2)
BASOPHILS NFR BLD AUTO: 0.8 % (ref 0–1)
BUN SERPL-MCNC: 16 MG/DL (ref 9.8–20.1)
CALCIUM SERPL-MCNC: 8.9 MG/DL (ref 7.8–10.44)
CHLORIDE SERPL-SCNC: 106 MMOL/L (ref 98–107)
CO2 SERPL-SCNC: 24 MMOL/L (ref 23–31)
CREAT CL PREDICTED SERPL C-G-VRATE: 104 ML/MIN (ref 70–130)
EOSINOPHIL # BLD AUTO: 0.3 THOU/UL (ref 0–0.7)
EOSINOPHIL NFR BLD AUTO: 2.5 % (ref 0–10)
GLUCOSE SERPL-MCNC: 123 MG/DL (ref 80–115)
HGB BLD-MCNC: 10.7 G/DL (ref 12–16)
LYMPHOCYTES # BLD AUTO: 2.3 THOU/UL (ref 1.2–3.4)
LYMPHOCYTES NFR BLD AUTO: 21.3 % (ref 21–51)
MCH RBC QN AUTO: 28.9 PG (ref 27–31)
MCV RBC AUTO: 87.5 FL (ref 78–98)
MONOCYTES # BLD AUTO: 0.7 THOU/UL (ref 0.11–0.59)
MONOCYTES NFR BLD AUTO: 5.9 % (ref 0–10)
NEUTROPHILS # BLD AUTO: 7.7 THOU/UL (ref 1.4–6.5)
NEUTROPHILS NFR BLD AUTO: 69.6 % (ref 42–75)
PLATELET # BLD AUTO: 558 10X3/UL (ref 130–400)
POTASSIUM SERPL-SCNC: 3.5 MMOL/L (ref 3.5–5.1)
RBC # BLD AUTO: 3.72 MILL/UL (ref 4.2–5.4)
SODIUM SERPL-SCNC: 141 MMOL/L (ref 136–145)
WBC # BLD AUTO: 11 10X3/UL (ref 4.8–10.8)

## 2023-01-22 RX ADMIN — HYDROCODONE BITARTRATE AND ACETAMINOPHEN PRN TAB: 5; 325 TABLET ORAL at 05:29

## 2023-01-22 RX ADMIN — HYDROCODONE BITARTRATE AND ACETAMINOPHEN PRN TAB: 5; 325 TABLET ORAL at 13:31

## 2023-01-22 RX ADMIN — HYDROCODONE BITARTRATE AND ACETAMINOPHEN PRN TAB: 5; 325 TABLET ORAL at 18:12

## 2023-01-22 RX ADMIN — ASPIRIN SCH MG: 81 TABLET ORAL at 20:35

## 2023-01-22 RX ADMIN — HYDROCODONE BITARTRATE AND ACETAMINOPHEN PRN TAB: 5; 325 TABLET ORAL at 09:50

## 2023-01-22 RX ADMIN — DOCUSATE SODIUM 50 MG AND SENNOSIDES 8.6 MG PRN TAB: 8.6; 5 TABLET, FILM COATED ORAL at 08:12

## 2023-01-22 RX ADMIN — ASPIRIN SCH MG: 81 TABLET ORAL at 08:12

## 2023-01-22 RX ADMIN — HYDROCODONE BITARTRATE AND ACETAMINOPHEN PRN TAB: 5; 325 TABLET ORAL at 22:17

## 2023-01-23 RX ADMIN — ASPIRIN SCH MG: 81 TABLET ORAL at 20:16

## 2023-01-23 RX ADMIN — ASPIRIN SCH MG: 81 TABLET ORAL at 09:31

## 2023-01-23 RX ADMIN — DOCUSATE SODIUM 50 MG AND SENNOSIDES 8.6 MG PRN TAB: 8.6; 5 TABLET, FILM COATED ORAL at 21:19

## 2023-01-23 RX ADMIN — HYDROCODONE BITARTRATE AND ACETAMINOPHEN PRN TAB: 5; 325 TABLET ORAL at 14:40

## 2023-01-23 RX ADMIN — DOCUSATE SODIUM 50 MG AND SENNOSIDES 8.6 MG PRN TAB: 8.6; 5 TABLET, FILM COATED ORAL at 09:30

## 2023-01-23 RX ADMIN — HYDROCODONE BITARTRATE AND ACETAMINOPHEN PRN TAB: 5; 325 TABLET ORAL at 09:40

## 2023-01-23 RX ADMIN — HYDROCODONE BITARTRATE AND ACETAMINOPHEN PRN TAB: 5; 325 TABLET ORAL at 05:26

## 2023-01-23 RX ADMIN — HYDROCODONE BITARTRATE AND ACETAMINOPHEN PRN TAB: 5; 325 TABLET ORAL at 20:15

## 2023-01-24 LAB
ANION GAP SERPL CALC-SCNC: 13 MMOL/L (ref 10–20)
BASOPHILS # BLD AUTO: 0.1 THOU/UL (ref 0–0.2)
BASOPHILS NFR BLD AUTO: 0.7 % (ref 0–1)
BUN SERPL-MCNC: 15 MG/DL (ref 9.8–20.1)
CALCIUM SERPL-MCNC: 9.4 MG/DL (ref 7.8–10.44)
CHLORIDE SERPL-SCNC: 104 MMOL/L (ref 98–107)
CO2 SERPL-SCNC: 27 MMOL/L (ref 23–31)
CREAT CL PREDICTED SERPL C-G-VRATE: 106 ML/MIN (ref 70–130)
EOSINOPHIL # BLD AUTO: 0.2 THOU/UL (ref 0–0.7)
EOSINOPHIL NFR BLD AUTO: 2.2 % (ref 0–10)
GLUCOSE SERPL-MCNC: 94 MG/DL (ref 80–115)
HGB BLD-MCNC: 10.5 G/DL (ref 12–16)
LYMPHOCYTES # BLD AUTO: 1.9 THOU/UL (ref 1.2–3.4)
LYMPHOCYTES NFR BLD AUTO: 19.4 % (ref 21–51)
MCH RBC QN AUTO: 28.6 PG (ref 27–31)
MCV RBC AUTO: 88.8 FL (ref 78–98)
MONOCYTES # BLD AUTO: 0.6 THOU/UL (ref 0.11–0.59)
MONOCYTES NFR BLD AUTO: 6.3 % (ref 0–10)
NEUTROPHILS # BLD AUTO: 7.2 THOU/UL (ref 1.4–6.5)
NEUTROPHILS NFR BLD AUTO: 71.5 % (ref 42–75)
PLATELET # BLD AUTO: 518 10X3/UL (ref 130–400)
POTASSIUM SERPL-SCNC: 3.5 MMOL/L (ref 3.5–5.1)
RBC # BLD AUTO: 3.66 MILL/UL (ref 4.2–5.4)
SODIUM SERPL-SCNC: 140 MMOL/L (ref 136–145)
WBC # BLD AUTO: 10 10X3/UL (ref 4.8–10.8)

## 2023-01-24 RX ADMIN — HYDROCODONE BITARTRATE AND ACETAMINOPHEN PRN TAB: 5; 325 TABLET ORAL at 19:36

## 2023-01-24 RX ADMIN — ASPIRIN SCH MG: 81 TABLET ORAL at 20:56

## 2023-01-24 RX ADMIN — ASPIRIN SCH MG: 81 TABLET ORAL at 08:50

## 2023-01-24 RX ADMIN — HYDROCODONE BITARTRATE AND ACETAMINOPHEN PRN TAB: 5; 325 TABLET ORAL at 05:27

## 2023-01-24 RX ADMIN — HYDROCODONE BITARTRATE AND ACETAMINOPHEN PRN TAB: 5; 325 TABLET ORAL at 09:27

## 2023-01-24 RX ADMIN — HYDROCODONE BITARTRATE AND ACETAMINOPHEN PRN TAB: 5; 325 TABLET ORAL at 13:47

## 2023-01-25 RX ADMIN — HYDROCODONE BITARTRATE AND ACETAMINOPHEN PRN TAB: 5; 325 TABLET ORAL at 23:22

## 2023-01-25 RX ADMIN — HYDROCODONE BITARTRATE AND ACETAMINOPHEN PRN TAB: 5; 325 TABLET ORAL at 01:14

## 2023-01-25 RX ADMIN — HYDROCODONE BITARTRATE AND ACETAMINOPHEN PRN TAB: 5; 325 TABLET ORAL at 18:18

## 2023-01-25 RX ADMIN — HYDROCODONE BITARTRATE AND ACETAMINOPHEN PRN TAB: 5; 325 TABLET ORAL at 14:07

## 2023-01-25 RX ADMIN — ASPIRIN SCH MG: 81 TABLET ORAL at 09:45

## 2023-01-25 RX ADMIN — ASPIRIN SCH MG: 81 TABLET ORAL at 20:19

## 2023-01-25 RX ADMIN — HYDROCODONE BITARTRATE AND ACETAMINOPHEN PRN TAB: 5; 325 TABLET ORAL at 09:44

## 2023-01-26 LAB
ANION GAP SERPL CALC-SCNC: 16 MMOL/L (ref 10–20)
BUN SERPL-MCNC: 17 MG/DL (ref 9.8–20.1)
CALCIUM SERPL-MCNC: 9.5 MG/DL (ref 7.8–10.44)
CHLORIDE SERPL-SCNC: 104 MMOL/L (ref 98–107)
CO2 SERPL-SCNC: 23 MMOL/L (ref 23–31)
CREAT CL PREDICTED SERPL C-G-VRATE: 92 ML/MIN (ref 70–130)
GLUCOSE SERPL-MCNC: 131 MG/DL (ref 80–115)
POTASSIUM SERPL-SCNC: 3.5 MMOL/L (ref 3.5–5.1)
SODIUM SERPL-SCNC: 139 MMOL/L (ref 136–145)

## 2023-01-26 RX ADMIN — HYDROCODONE BITARTRATE AND ACETAMINOPHEN PRN TAB: 5; 325 TABLET ORAL at 05:03

## 2023-01-26 RX ADMIN — HYDROCODONE BITARTRATE AND ACETAMINOPHEN PRN TAB: 5; 325 TABLET ORAL at 18:56

## 2023-01-26 RX ADMIN — HYDROCODONE BITARTRATE AND ACETAMINOPHEN PRN TAB: 5; 325 TABLET ORAL at 09:47

## 2023-01-26 RX ADMIN — HYDROCODONE BITARTRATE AND ACETAMINOPHEN PRN TAB: 5; 325 TABLET ORAL at 14:55

## 2023-01-26 RX ADMIN — ASPIRIN SCH MG: 81 TABLET ORAL at 21:06

## 2023-01-26 RX ADMIN — HYDROCODONE BITARTRATE AND ACETAMINOPHEN PRN TAB: 5; 325 TABLET ORAL at 23:06

## 2023-01-26 RX ADMIN — ASPIRIN SCH MG: 81 TABLET ORAL at 09:46

## 2023-01-26 RX ADMIN — DOCUSATE SODIUM 50 MG AND SENNOSIDES 8.6 MG PRN TAB: 8.6; 5 TABLET, FILM COATED ORAL at 09:52

## 2023-01-27 RX ADMIN — HYDROCODONE BITARTRATE AND ACETAMINOPHEN PRN TAB: 5; 325 TABLET ORAL at 09:48

## 2023-01-27 RX ADMIN — DOCUSATE SODIUM 50 MG AND SENNOSIDES 8.6 MG PRN TAB: 8.6; 5 TABLET, FILM COATED ORAL at 09:46

## 2023-01-27 RX ADMIN — HYDROCODONE BITARTRATE AND ACETAMINOPHEN PRN TAB: 5; 325 TABLET ORAL at 13:48

## 2023-01-27 RX ADMIN — ASPIRIN SCH MG: 81 TABLET ORAL at 09:49

## 2023-01-27 RX ADMIN — ASPIRIN SCH MG: 81 TABLET ORAL at 21:18

## 2023-01-27 RX ADMIN — HYDROCODONE BITARTRATE AND ACETAMINOPHEN PRN TAB: 5; 325 TABLET ORAL at 17:55

## 2023-01-27 RX ADMIN — HYDROCODONE BITARTRATE AND ACETAMINOPHEN PRN TAB: 5; 325 TABLET ORAL at 05:43

## 2023-01-28 RX ADMIN — ASPIRIN SCH MG: 81 TABLET ORAL at 08:54

## 2023-01-28 RX ADMIN — HYDROCODONE BITARTRATE AND ACETAMINOPHEN PRN TAB: 5; 325 TABLET ORAL at 10:20

## 2023-01-28 RX ADMIN — ASPIRIN SCH MG: 81 TABLET ORAL at 20:35

## 2023-01-28 RX ADMIN — HYDROCODONE BITARTRATE AND ACETAMINOPHEN PRN TAB: 5; 325 TABLET ORAL at 01:17

## 2023-01-28 RX ADMIN — HYDROCODONE BITARTRATE AND ACETAMINOPHEN PRN TAB: 5; 325 TABLET ORAL at 17:04

## 2023-01-28 RX ADMIN — HYDROCODONE BITARTRATE AND ACETAMINOPHEN PRN TAB: 5; 325 TABLET ORAL at 23:18

## 2023-01-29 VITALS — TEMPERATURE: 97.3 F | SYSTOLIC BLOOD PRESSURE: 114 MMHG | DIASTOLIC BLOOD PRESSURE: 58 MMHG

## 2023-01-29 RX ADMIN — ASPIRIN SCH MG: 81 TABLET ORAL at 08:45

## 2023-01-29 RX ADMIN — HYDROCODONE BITARTRATE AND ACETAMINOPHEN PRN TAB: 5; 325 TABLET ORAL at 08:46

## 2023-10-30 ENCOUNTER — HOSPITAL ENCOUNTER (INPATIENT)
Dept: HOSPITAL 92 - ERS | Age: 73
LOS: 2 days | Discharge: NURSING, REHAB OR LONG-TERM CARE | DRG: 534 | End: 2023-11-01
Attending: SURGERY | Admitting: SURGERY
Payer: COMMERCIAL

## 2023-10-30 VITALS — BODY MASS INDEX: 34.9 KG/M2

## 2023-10-30 DIAGNOSIS — Z96.653: ICD-10-CM

## 2023-10-30 DIAGNOSIS — Z98.890: ICD-10-CM

## 2023-10-30 DIAGNOSIS — F31.9: ICD-10-CM

## 2023-10-30 DIAGNOSIS — E03.9: ICD-10-CM

## 2023-10-30 DIAGNOSIS — Z96.641: ICD-10-CM

## 2023-10-30 DIAGNOSIS — Z88.2: ICD-10-CM

## 2023-10-30 DIAGNOSIS — Z88.1: ICD-10-CM

## 2023-10-30 DIAGNOSIS — E66.9: ICD-10-CM
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
## 2023-10-30 DIAGNOSIS — S72.412A: Primary | ICD-10-CM

## 2023-10-30 DIAGNOSIS — E78.00: ICD-10-CM

## 2023-10-30 DIAGNOSIS — I10: ICD-10-CM

## 2023-10-30 DIAGNOSIS — E87.6: ICD-10-CM

## 2023-10-30 DIAGNOSIS — S51.012A: ICD-10-CM

## 2023-10-30 DIAGNOSIS — Z90.49: ICD-10-CM

## 2023-10-30 LAB
ALBUMIN SERPL BCG-MCNC: 4.8 G/DL (ref 3.4–4.8)
ALP SERPL-CCNC: 108 U/L (ref 40–110)
ALT SERPL W P-5'-P-CCNC: 18 U/L (ref 8–55)
ANION GAP SERPL CALC-SCNC: 18 MMOL/L (ref 10–20)
AST SERPL-CCNC: 15 U/L (ref 5–34)
BASOPHILS # BLD AUTO: 0 THOU/UL (ref 0–0.2)
BASOPHILS NFR BLD AUTO: 0.3 % (ref 0–1)
BILIRUB SERPL-MCNC: 0.5 MG/DL (ref 0.2–1.2)
BUN SERPL-MCNC: 17 MG/DL (ref 9.8–20.1)
CALCIUM SERPL-MCNC: 9.9 MG/DL (ref 7.8–10.44)
CHLORIDE SERPL-SCNC: 100 MMOL/L (ref 98–107)
CO2 SERPL-SCNC: 24 MMOL/L (ref 23–31)
CREAT CL PREDICTED SERPL C-G-VRATE: 0 ML/MIN (ref 70–130)
EOSINOPHIL # BLD AUTO: 0.2 THOU/UL (ref 0–0.7)
EOSINOPHIL NFR BLD AUTO: 1.1 % (ref 0–10)
GLOBULIN SER CALC-MCNC: 3.1 G/DL (ref 2.4–3.5)
GLUCOSE SERPL-MCNC: 117 MG/DL (ref 83–110)
HCT VFR BLD CALC: 40.7 % (ref 36–47)
HGB BLD-MCNC: 13.6 G/DL (ref 12–16)
LYMPHOCYTES NFR BLD AUTO: 27.7 % (ref 21–51)
MAGNESIUM SERPL-MCNC: 1.5 MG/DL (ref 1.6–2.6)
MCH RBC QN AUTO: 28.1 PG (ref 27–31)
MCV RBC AUTO: 84.1 FL (ref 78–98)
MONOCYTES # BLD AUTO: 0.8 THOU/UL (ref 0.11–0.59)
MONOCYTES NFR BLD AUTO: 5.9 % (ref 0–10)
NEUTROPHILS # BLD AUTO: 8.5 THOU/UL (ref 1.4–6.5)
NEUTROPHILS NFR BLD AUTO: 64.3 % (ref 42–75)
PLATELET # BLD AUTO: 399 10X3/UL (ref 130–400)
POTASSIUM SERPL-SCNC: 3 MMOL/L (ref 3.5–5.1)
RBC # BLD AUTO: 4.84 MILL/UL (ref 4.2–5.4)
SODIUM SERPL-SCNC: 139 MMOL/L (ref 136–145)
WBC # BLD AUTO: 13.2 10X3/UL (ref 4.8–10.8)

## 2023-10-30 PROCEDURE — 0HQEXZZ REPAIR LEFT LOWER ARM SKIN, EXTERNAL APPROACH: ICD-10-PCS | Performed by: EMERGENCY MEDICINE

## 2023-10-30 PROCEDURE — 36415 COLL VENOUS BLD VENIPUNCTURE: CPT

## 2023-10-30 PROCEDURE — 85025 COMPLETE CBC W/AUTO DIFF WBC: CPT

## 2023-10-30 PROCEDURE — 83735 ASSAY OF MAGNESIUM: CPT

## 2023-10-30 PROCEDURE — S0028 INJECTION, FAMOTIDINE, 20 MG: HCPCS

## 2023-10-30 PROCEDURE — 96375 TX/PRO/DX INJ NEW DRUG ADDON: CPT

## 2023-10-30 PROCEDURE — 80053 COMPREHEN METABOLIC PANEL: CPT

## 2023-10-30 PROCEDURE — 96374 THER/PROPH/DIAG INJ IV PUSH: CPT

## 2023-10-30 PROCEDURE — 80048 BASIC METABOLIC PNL TOTAL CA: CPT

## 2023-10-30 PROCEDURE — 93005 ELECTROCARDIOGRAM TRACING: CPT

## 2023-10-30 RX ADMIN — POTASSIUM CHLORIDE SCH MLS: 14.9 INJECTION, SOLUTION INTRAVENOUS at 16:19

## 2023-10-30 RX ADMIN — FAMOTIDINE SCH MG: 10 INJECTION, SOLUTION INTRAVENOUS at 20:33

## 2023-10-30 RX ADMIN — FAMOTIDINE SCH MG: 10 INJECTION, SOLUTION INTRAVENOUS at 12:21

## 2023-10-30 RX ADMIN — POTASSIUM CHLORIDE SCH MLS: 14.9 INJECTION, SOLUTION INTRAVENOUS at 12:21

## 2023-10-30 RX ADMIN — MAGNESIUM SULFATE HEPTAHYDRATE SCH MLS: 40 INJECTION, SOLUTION INTRAVENOUS at 15:04

## 2023-10-30 RX ADMIN — MAGNESIUM SULFATE HEPTAHYDRATE SCH MLS: 40 INJECTION, SOLUTION INTRAVENOUS at 12:48

## 2023-10-31 LAB
ANION GAP SERPL CALC-SCNC: 15 MMOL/L (ref 10–20)
BASOPHILS # BLD AUTO: 0 THOU/UL (ref 0–0.2)
BASOPHILS NFR BLD AUTO: 0.5 % (ref 0–1)
BUN SERPL-MCNC: 13 MG/DL (ref 9.8–20.1)
CALCIUM SERPL-MCNC: 9.1 MG/DL (ref 7.8–10.44)
CHLORIDE SERPL-SCNC: 105 MMOL/L (ref 98–107)
CO2 SERPL-SCNC: 26 MMOL/L (ref 23–31)
CREAT CL PREDICTED SERPL C-G-VRATE: 88 ML/MIN (ref 70–130)
EOSINOPHIL # BLD AUTO: 0.2 THOU/UL (ref 0–0.7)
EOSINOPHIL NFR BLD AUTO: 2.7 % (ref 0–10)
GLUCOSE SERPL-MCNC: 104 MG/DL (ref 83–110)
HCT VFR BLD CALC: 38.3 % (ref 36–47)
HGB BLD-MCNC: 12.4 G/DL (ref 12–16)
LYMPHOCYTES NFR BLD AUTO: 34 % (ref 21–51)
MAGNESIUM SERPL-MCNC: 1.8 MG/DL (ref 1.6–2.6)
MCH RBC QN AUTO: 27.9 PG (ref 27–31)
MCV RBC AUTO: 86.1 FL (ref 78–98)
MONOCYTES # BLD AUTO: 0.7 THOU/UL (ref 0.11–0.59)
MONOCYTES NFR BLD AUTO: 8.1 % (ref 0–10)
NEUTROPHILS # BLD AUTO: 4.5 THOU/UL (ref 1.4–6.5)
NEUTROPHILS NFR BLD AUTO: 54 % (ref 42–75)
PLATELET # BLD AUTO: 367 10X3/UL (ref 130–400)
POTASSIUM SERPL-SCNC: 3.2 MMOL/L (ref 3.5–5.1)
RBC # BLD AUTO: 4.45 MILL/UL (ref 4.2–5.4)
SODIUM SERPL-SCNC: 143 MMOL/L (ref 136–145)
WBC # BLD AUTO: 8.4 10X3/UL (ref 4.8–10.8)

## 2023-10-31 RX ADMIN — FAMOTIDINE SCH: 10 INJECTION, SOLUTION INTRAVENOUS at 22:00

## 2023-10-31 RX ADMIN — ACETAMINOPHEN AND CODEINE PHOSPHATE SCH: 300; 30 TABLET ORAL at 06:18

## 2023-10-31 RX ADMIN — ACETAMINOPHEN AND CODEINE PHOSPHATE SCH TAB: 300; 30 TABLET ORAL at 12:42

## 2023-10-31 RX ADMIN — FAMOTIDINE SCH MG: 10 INJECTION, SOLUTION INTRAVENOUS at 08:53

## 2023-10-31 RX ADMIN — ACETAMINOPHEN AND CODEINE PHOSPHATE SCH TAB: 300; 30 TABLET ORAL at 23:30

## 2023-10-31 RX ADMIN — SENNOSIDES SCH TAB: 8.6 TABLET, FILM COATED ORAL at 08:53

## 2023-10-31 RX ADMIN — ACETAMINOPHEN AND CODEINE PHOSPHATE SCH TAB: 300; 30 TABLET ORAL at 17:26

## 2023-11-01 VITALS — TEMPERATURE: 98 F | SYSTOLIC BLOOD PRESSURE: 115 MMHG | DIASTOLIC BLOOD PRESSURE: 75 MMHG

## 2023-11-01 RX ADMIN — ACETAMINOPHEN AND CODEINE PHOSPHATE SCH TAB: 300; 30 TABLET ORAL at 12:27

## 2023-11-01 RX ADMIN — SENNOSIDES SCH TAB: 8.6 TABLET, FILM COATED ORAL at 10:08

## 2023-11-01 RX ADMIN — ACETAMINOPHEN AND CODEINE PHOSPHATE SCH TAB: 300; 30 TABLET ORAL at 05:13

## 2024-03-28 ENCOUNTER — HOSPITAL ENCOUNTER (OUTPATIENT)
Dept: HOSPITAL 92 - BICMAMMO | Age: 74
Discharge: HOME | End: 2024-03-28
Attending: FAMILY MEDICINE
Payer: COMMERCIAL

## 2024-03-28 DIAGNOSIS — Z12.31: Primary | ICD-10-CM

## 2024-03-28 DIAGNOSIS — Z80.3: ICD-10-CM

## 2024-03-28 DIAGNOSIS — N64.89: ICD-10-CM

## 2024-03-28 DIAGNOSIS — M85.852: ICD-10-CM

## 2024-03-28 DIAGNOSIS — M81.0: ICD-10-CM

## 2024-03-28 PROCEDURE — 77067 SCR MAMMO BI INCL CAD: CPT

## 2024-03-28 PROCEDURE — 77063 BREAST TOMOSYNTHESIS BI: CPT

## 2024-03-28 PROCEDURE — 77080 DXA BONE DENSITY AXIAL: CPT
